# Patient Record
Sex: FEMALE | Race: WHITE | NOT HISPANIC OR LATINO | Employment: FULL TIME
[De-identification: names, ages, dates, MRNs, and addresses within clinical notes are randomized per-mention and may not be internally consistent; named-entity substitution may affect disease eponyms.]

---

## 2017-01-18 ENCOUNTER — RECORDS - HEALTHEAST (OUTPATIENT)
Dept: ADMINISTRATIVE | Facility: OTHER | Age: 52
End: 2017-01-18

## 2017-01-23 ENCOUNTER — AMBULATORY - HEALTHEAST (OUTPATIENT)
Dept: LAB | Facility: CLINIC | Age: 52
End: 2017-01-23

## 2017-01-23 DIAGNOSIS — R31.9 HEMATURIA: ICD-10-CM

## 2017-02-13 ENCOUNTER — AMBULATORY - HEALTHEAST (OUTPATIENT)
Dept: LAB | Facility: CLINIC | Age: 52
End: 2017-02-13

## 2017-02-13 DIAGNOSIS — R31.9 HEMATURIA: ICD-10-CM

## 2017-02-27 ENCOUNTER — COMMUNICATION - HEALTHEAST (OUTPATIENT)
Dept: FAMILY MEDICINE | Facility: CLINIC | Age: 52
End: 2017-02-27

## 2017-02-27 ENCOUNTER — MEDICAL CORRESPONDENCE (OUTPATIENT)
Dept: HEALTH INFORMATION MANAGEMENT | Facility: CLINIC | Age: 52
End: 2017-02-27

## 2017-03-13 ENCOUNTER — OFFICE VISIT - HEALTHEAST (OUTPATIENT)
Dept: FAMILY MEDICINE | Facility: CLINIC | Age: 52
End: 2017-03-13

## 2017-03-13 DIAGNOSIS — G24.5 SPASM EYELID: ICD-10-CM

## 2017-03-13 DIAGNOSIS — E05.00 GRAVES DISEASE: ICD-10-CM

## 2017-03-13 ASSESSMENT — MIFFLIN-ST. JEOR: SCORE: 1152.72

## 2017-05-01 ENCOUNTER — OFFICE VISIT - HEALTHEAST (OUTPATIENT)
Dept: FAMILY MEDICINE | Facility: CLINIC | Age: 52
End: 2017-05-01

## 2017-05-01 DIAGNOSIS — H81.10 BENIGN POSITIONAL VERTIGO: ICD-10-CM

## 2017-05-01 DIAGNOSIS — E78.00 HYPERCHOLESTEROLEMIA: ICD-10-CM

## 2017-05-01 ASSESSMENT — MIFFLIN-ST. JEOR: SCORE: 1141.95

## 2017-05-08 ENCOUNTER — RECORDS - HEALTHEAST (OUTPATIENT)
Dept: ADMINISTRATIVE | Facility: OTHER | Age: 52
End: 2017-05-08

## 2017-05-15 ENCOUNTER — AMBULATORY - HEALTHEAST (OUTPATIENT)
Dept: FAMILY MEDICINE | Facility: CLINIC | Age: 52
End: 2017-05-15

## 2017-05-15 ENCOUNTER — AMBULATORY - HEALTHEAST (OUTPATIENT)
Dept: LAB | Facility: CLINIC | Age: 52
End: 2017-05-15

## 2017-05-15 DIAGNOSIS — E05.00 GRAVES DISEASE: ICD-10-CM

## 2017-05-15 DIAGNOSIS — E78.00 HYPERCHOLESTEROLEMIA: ICD-10-CM

## 2017-05-15 DIAGNOSIS — G24.5 SPASM EYELID: ICD-10-CM

## 2017-05-15 DIAGNOSIS — E05.90 HYPERTHYROIDISM: ICD-10-CM

## 2017-05-15 LAB
CHOLEST SERPL-MCNC: 291 MG/DL
FASTING STATUS PATIENT QL REPORTED: YES
HDLC SERPL-MCNC: 82 MG/DL
LDLC SERPL CALC-MCNC: 193 MG/DL
TRIGL SERPL-MCNC: 82 MG/DL

## 2017-05-16 ENCOUNTER — COMMUNICATION - HEALTHEAST (OUTPATIENT)
Dept: FAMILY MEDICINE | Facility: CLINIC | Age: 52
End: 2017-05-16

## 2017-05-19 ENCOUNTER — COMMUNICATION - HEALTHEAST (OUTPATIENT)
Dept: FAMILY MEDICINE | Facility: CLINIC | Age: 52
End: 2017-05-19

## 2017-05-19 DIAGNOSIS — E05.00 GRAVES DISEASE: ICD-10-CM

## 2017-06-12 ENCOUNTER — AMBULATORY - HEALTHEAST (OUTPATIENT)
Dept: LAB | Facility: CLINIC | Age: 52
End: 2017-06-12

## 2017-06-12 DIAGNOSIS — E05.00 GRAVES DISEASE: ICD-10-CM

## 2017-06-17 ENCOUNTER — HEALTH MAINTENANCE LETTER (OUTPATIENT)
Age: 52
End: 2017-06-17

## 2017-06-27 ENCOUNTER — COMMUNICATION - HEALTHEAST (OUTPATIENT)
Dept: FAMILY MEDICINE | Facility: CLINIC | Age: 52
End: 2017-06-27

## 2017-08-15 ENCOUNTER — COMMUNICATION - HEALTHEAST (OUTPATIENT)
Dept: FAMILY MEDICINE | Facility: CLINIC | Age: 52
End: 2017-08-15

## 2017-08-15 DIAGNOSIS — E05.00 GRAVES DISEASE: ICD-10-CM

## 2017-08-21 ENCOUNTER — AMBULATORY - HEALTHEAST (OUTPATIENT)
Dept: LAB | Facility: CLINIC | Age: 52
End: 2017-08-21

## 2017-08-21 DIAGNOSIS — E05.00 GRAVES DISEASE: ICD-10-CM

## 2017-08-22 ENCOUNTER — COMMUNICATION - HEALTHEAST (OUTPATIENT)
Dept: FAMILY MEDICINE | Facility: CLINIC | Age: 52
End: 2017-08-22

## 2017-08-22 ENCOUNTER — OFFICE VISIT - HEALTHEAST (OUTPATIENT)
Dept: FAMILY MEDICINE | Facility: CLINIC | Age: 52
End: 2017-08-22

## 2017-08-22 DIAGNOSIS — M79.601 RIGHT ARM PAIN: ICD-10-CM

## 2017-08-22 DIAGNOSIS — R49.0 HOARSE VOICE QUALITY: ICD-10-CM

## 2017-08-22 DIAGNOSIS — E05.00 GRAVES DISEASE: ICD-10-CM

## 2017-08-22 ASSESSMENT — MIFFLIN-ST. JEOR: SCORE: 1126.79

## 2017-08-29 ENCOUNTER — COMMUNICATION - HEALTHEAST (OUTPATIENT)
Dept: FAMILY MEDICINE | Facility: CLINIC | Age: 52
End: 2017-08-29

## 2017-08-29 ENCOUNTER — OFFICE VISIT - HEALTHEAST (OUTPATIENT)
Dept: PHYSICAL THERAPY | Facility: REHABILITATION | Age: 52
End: 2017-08-29

## 2017-08-29 DIAGNOSIS — M25.611 DECREASED ROM OF RIGHT SHOULDER: ICD-10-CM

## 2017-08-29 DIAGNOSIS — G89.29 ELBOW PAIN, CHRONIC, RIGHT: ICD-10-CM

## 2017-08-29 DIAGNOSIS — M25.521 ELBOW PAIN, CHRONIC, RIGHT: ICD-10-CM

## 2017-08-29 DIAGNOSIS — M62.81 MUSCLE WEAKNESS OF RIGHT UPPER EXTREMITY: ICD-10-CM

## 2017-08-29 DIAGNOSIS — M25.511 CHRONIC RIGHT SHOULDER PAIN: ICD-10-CM

## 2017-08-29 DIAGNOSIS — M77.11 RIGHT LATERAL EPICONDYLITIS: ICD-10-CM

## 2017-08-29 DIAGNOSIS — M75.21 BICIPITAL TENDONITIS OF RIGHT SHOULDER: ICD-10-CM

## 2017-08-29 DIAGNOSIS — M75.81 RIGHT ROTATOR CUFF TENDONITIS: ICD-10-CM

## 2017-08-29 DIAGNOSIS — G89.29 CHRONIC RIGHT SHOULDER PAIN: ICD-10-CM

## 2017-09-05 ENCOUNTER — OFFICE VISIT - HEALTHEAST (OUTPATIENT)
Dept: PHYSICAL THERAPY | Facility: REHABILITATION | Age: 52
End: 2017-09-05

## 2017-09-05 DIAGNOSIS — G89.29 ELBOW PAIN, CHRONIC, RIGHT: ICD-10-CM

## 2017-09-05 DIAGNOSIS — M25.511 CHRONIC RIGHT SHOULDER PAIN: ICD-10-CM

## 2017-09-05 DIAGNOSIS — M62.81 MUSCLE WEAKNESS OF RIGHT UPPER EXTREMITY: ICD-10-CM

## 2017-09-05 DIAGNOSIS — M25.521 ELBOW PAIN, CHRONIC, RIGHT: ICD-10-CM

## 2017-09-05 DIAGNOSIS — M77.11 RIGHT LATERAL EPICONDYLITIS: ICD-10-CM

## 2017-09-05 DIAGNOSIS — G89.29 CHRONIC RIGHT SHOULDER PAIN: ICD-10-CM

## 2017-09-05 DIAGNOSIS — M25.611 DECREASED ROM OF RIGHT SHOULDER: ICD-10-CM

## 2017-09-05 DIAGNOSIS — M75.81 RIGHT ROTATOR CUFF TENDONITIS: ICD-10-CM

## 2017-09-05 DIAGNOSIS — M75.21 BICIPITAL TENDONITIS OF RIGHT SHOULDER: ICD-10-CM

## 2017-09-21 ENCOUNTER — OFFICE VISIT - HEALTHEAST (OUTPATIENT)
Dept: PHYSICAL THERAPY | Facility: REHABILITATION | Age: 52
End: 2017-09-21

## 2017-09-21 DIAGNOSIS — M75.81 RIGHT ROTATOR CUFF TENDONITIS: ICD-10-CM

## 2017-09-21 DIAGNOSIS — G89.29 CHRONIC RIGHT SHOULDER PAIN: ICD-10-CM

## 2017-09-21 DIAGNOSIS — M77.11 RIGHT LATERAL EPICONDYLITIS: ICD-10-CM

## 2017-09-21 DIAGNOSIS — M75.21 BICIPITAL TENDONITIS OF RIGHT SHOULDER: ICD-10-CM

## 2017-09-21 DIAGNOSIS — G89.29 ELBOW PAIN, CHRONIC, RIGHT: ICD-10-CM

## 2017-09-21 DIAGNOSIS — M25.511 CHRONIC RIGHT SHOULDER PAIN: ICD-10-CM

## 2017-09-21 DIAGNOSIS — M62.81 MUSCLE WEAKNESS OF RIGHT UPPER EXTREMITY: ICD-10-CM

## 2017-09-21 DIAGNOSIS — M25.521 ELBOW PAIN, CHRONIC, RIGHT: ICD-10-CM

## 2017-09-21 DIAGNOSIS — M25.611 DECREASED ROM OF RIGHT SHOULDER: ICD-10-CM

## 2017-10-05 ENCOUNTER — OFFICE VISIT - HEALTHEAST (OUTPATIENT)
Dept: PHYSICAL THERAPY | Facility: REHABILITATION | Age: 52
End: 2017-10-05

## 2017-10-05 ENCOUNTER — COMMUNICATION - HEALTHEAST (OUTPATIENT)
Dept: PHYSICAL THERAPY | Facility: REHABILITATION | Age: 52
End: 2017-10-05

## 2017-10-05 DIAGNOSIS — M25.521 ELBOW PAIN, CHRONIC, RIGHT: ICD-10-CM

## 2017-10-05 DIAGNOSIS — M25.511 CHRONIC RIGHT SHOULDER PAIN: ICD-10-CM

## 2017-10-05 DIAGNOSIS — M62.81 MUSCLE WEAKNESS OF RIGHT UPPER EXTREMITY: ICD-10-CM

## 2017-10-05 DIAGNOSIS — M75.21 BICIPITAL TENDONITIS OF RIGHT SHOULDER: ICD-10-CM

## 2017-10-05 DIAGNOSIS — M25.611 DECREASED ROM OF RIGHT SHOULDER: ICD-10-CM

## 2017-10-05 DIAGNOSIS — M75.81 RIGHT ROTATOR CUFF TENDONITIS: ICD-10-CM

## 2017-10-05 DIAGNOSIS — G89.29 ELBOW PAIN, CHRONIC, RIGHT: ICD-10-CM

## 2017-10-05 DIAGNOSIS — M77.11 RIGHT LATERAL EPICONDYLITIS: ICD-10-CM

## 2017-10-05 DIAGNOSIS — G89.29 CHRONIC RIGHT SHOULDER PAIN: ICD-10-CM

## 2017-10-10 ENCOUNTER — OFFICE VISIT - HEALTHEAST (OUTPATIENT)
Dept: PHYSICAL THERAPY | Facility: REHABILITATION | Age: 52
End: 2017-10-10

## 2017-10-10 DIAGNOSIS — M75.21 BICIPITAL TENDONITIS OF RIGHT SHOULDER: ICD-10-CM

## 2017-10-10 DIAGNOSIS — M25.511 CHRONIC RIGHT SHOULDER PAIN: ICD-10-CM

## 2017-10-10 DIAGNOSIS — M62.81 MUSCLE WEAKNESS OF RIGHT UPPER EXTREMITY: ICD-10-CM

## 2017-10-10 DIAGNOSIS — G89.29 CHRONIC RIGHT SHOULDER PAIN: ICD-10-CM

## 2017-10-10 DIAGNOSIS — G89.29 ELBOW PAIN, CHRONIC, RIGHT: ICD-10-CM

## 2017-10-10 DIAGNOSIS — M77.11 RIGHT LATERAL EPICONDYLITIS: ICD-10-CM

## 2017-10-10 DIAGNOSIS — M75.81 RIGHT ROTATOR CUFF TENDONITIS: ICD-10-CM

## 2017-10-10 DIAGNOSIS — M25.521 ELBOW PAIN, CHRONIC, RIGHT: ICD-10-CM

## 2017-10-10 DIAGNOSIS — M25.611 DECREASED ROM OF RIGHT SHOULDER: ICD-10-CM

## 2017-10-12 ENCOUNTER — OFFICE VISIT - HEALTHEAST (OUTPATIENT)
Dept: PHYSICAL THERAPY | Facility: REHABILITATION | Age: 52
End: 2017-10-12

## 2017-10-12 DIAGNOSIS — M25.521 ELBOW PAIN, CHRONIC, RIGHT: ICD-10-CM

## 2017-10-12 DIAGNOSIS — M77.11 RIGHT LATERAL EPICONDYLITIS: ICD-10-CM

## 2017-10-12 DIAGNOSIS — G89.29 CHRONIC RIGHT SHOULDER PAIN: ICD-10-CM

## 2017-10-12 DIAGNOSIS — M75.21 BICIPITAL TENDONITIS OF RIGHT SHOULDER: ICD-10-CM

## 2017-10-12 DIAGNOSIS — M25.611 DECREASED ROM OF RIGHT SHOULDER: ICD-10-CM

## 2017-10-12 DIAGNOSIS — M75.81 RIGHT ROTATOR CUFF TENDONITIS: ICD-10-CM

## 2017-10-12 DIAGNOSIS — M62.81 MUSCLE WEAKNESS OF RIGHT UPPER EXTREMITY: ICD-10-CM

## 2017-10-12 DIAGNOSIS — M25.511 CHRONIC RIGHT SHOULDER PAIN: ICD-10-CM

## 2017-10-12 DIAGNOSIS — G89.29 ELBOW PAIN, CHRONIC, RIGHT: ICD-10-CM

## 2017-10-16 ENCOUNTER — OFFICE VISIT - HEALTHEAST (OUTPATIENT)
Dept: PHYSICAL THERAPY | Facility: REHABILITATION | Age: 52
End: 2017-10-16

## 2017-10-16 DIAGNOSIS — M25.521 ELBOW PAIN, CHRONIC, RIGHT: ICD-10-CM

## 2017-10-16 DIAGNOSIS — M25.611 DECREASED ROM OF RIGHT SHOULDER: ICD-10-CM

## 2017-10-16 DIAGNOSIS — G89.29 CHRONIC RIGHT SHOULDER PAIN: ICD-10-CM

## 2017-10-16 DIAGNOSIS — M25.511 CHRONIC RIGHT SHOULDER PAIN: ICD-10-CM

## 2017-10-16 DIAGNOSIS — G89.29 ELBOW PAIN, CHRONIC, RIGHT: ICD-10-CM

## 2017-10-16 DIAGNOSIS — M62.81 MUSCLE WEAKNESS OF RIGHT UPPER EXTREMITY: ICD-10-CM

## 2017-10-16 DIAGNOSIS — M75.21 BICIPITAL TENDONITIS OF RIGHT SHOULDER: ICD-10-CM

## 2017-10-16 DIAGNOSIS — M77.11 RIGHT LATERAL EPICONDYLITIS: ICD-10-CM

## 2017-10-16 DIAGNOSIS — M75.81 RIGHT ROTATOR CUFF TENDONITIS: ICD-10-CM

## 2017-10-18 ENCOUNTER — OFFICE VISIT - HEALTHEAST (OUTPATIENT)
Dept: PHYSICAL THERAPY | Facility: REHABILITATION | Age: 52
End: 2017-10-18

## 2017-10-18 DIAGNOSIS — M25.511 CHRONIC RIGHT SHOULDER PAIN: ICD-10-CM

## 2017-10-18 DIAGNOSIS — M75.81 RIGHT ROTATOR CUFF TENDONITIS: ICD-10-CM

## 2017-10-18 DIAGNOSIS — G89.29 ELBOW PAIN, CHRONIC, RIGHT: ICD-10-CM

## 2017-10-18 DIAGNOSIS — M25.611 DECREASED ROM OF RIGHT SHOULDER: ICD-10-CM

## 2017-10-18 DIAGNOSIS — M77.11 RIGHT LATERAL EPICONDYLITIS: ICD-10-CM

## 2017-10-18 DIAGNOSIS — G89.29 CHRONIC RIGHT SHOULDER PAIN: ICD-10-CM

## 2017-10-18 DIAGNOSIS — M62.81 MUSCLE WEAKNESS OF RIGHT UPPER EXTREMITY: ICD-10-CM

## 2017-10-18 DIAGNOSIS — M25.521 ELBOW PAIN, CHRONIC, RIGHT: ICD-10-CM

## 2017-10-18 DIAGNOSIS — M75.21 BICIPITAL TENDONITIS OF RIGHT SHOULDER: ICD-10-CM

## 2017-10-24 ENCOUNTER — OFFICE VISIT - HEALTHEAST (OUTPATIENT)
Dept: PHYSICAL THERAPY | Facility: REHABILITATION | Age: 52
End: 2017-10-24

## 2017-10-24 DIAGNOSIS — M25.611 DECREASED ROM OF RIGHT SHOULDER: ICD-10-CM

## 2017-10-24 DIAGNOSIS — M25.511 CHRONIC RIGHT SHOULDER PAIN: ICD-10-CM

## 2017-10-24 DIAGNOSIS — M25.521 ELBOW PAIN, CHRONIC, RIGHT: ICD-10-CM

## 2017-10-24 DIAGNOSIS — M75.21 BICIPITAL TENDONITIS OF RIGHT SHOULDER: ICD-10-CM

## 2017-10-24 DIAGNOSIS — M75.81 RIGHT ROTATOR CUFF TENDONITIS: ICD-10-CM

## 2017-10-24 DIAGNOSIS — G89.29 ELBOW PAIN, CHRONIC, RIGHT: ICD-10-CM

## 2017-10-24 DIAGNOSIS — M62.81 MUSCLE WEAKNESS OF RIGHT UPPER EXTREMITY: ICD-10-CM

## 2017-10-24 DIAGNOSIS — M77.11 RIGHT LATERAL EPICONDYLITIS: ICD-10-CM

## 2017-10-24 DIAGNOSIS — G89.29 CHRONIC RIGHT SHOULDER PAIN: ICD-10-CM

## 2017-10-26 ENCOUNTER — OFFICE VISIT - HEALTHEAST (OUTPATIENT)
Dept: PHYSICAL THERAPY | Facility: REHABILITATION | Age: 52
End: 2017-10-26

## 2017-10-26 DIAGNOSIS — M25.511 CHRONIC RIGHT SHOULDER PAIN: ICD-10-CM

## 2017-10-26 DIAGNOSIS — M75.21 BICIPITAL TENDONITIS OF RIGHT SHOULDER: ICD-10-CM

## 2017-10-26 DIAGNOSIS — M77.11 RIGHT LATERAL EPICONDYLITIS: ICD-10-CM

## 2017-10-26 DIAGNOSIS — G89.29 ELBOW PAIN, CHRONIC, RIGHT: ICD-10-CM

## 2017-10-26 DIAGNOSIS — M75.81 RIGHT ROTATOR CUFF TENDONITIS: ICD-10-CM

## 2017-10-26 DIAGNOSIS — M25.611 DECREASED ROM OF RIGHT SHOULDER: ICD-10-CM

## 2017-10-26 DIAGNOSIS — G89.29 CHRONIC RIGHT SHOULDER PAIN: ICD-10-CM

## 2017-10-26 DIAGNOSIS — M25.521 ELBOW PAIN, CHRONIC, RIGHT: ICD-10-CM

## 2017-10-26 DIAGNOSIS — M62.81 MUSCLE WEAKNESS OF RIGHT UPPER EXTREMITY: ICD-10-CM

## 2017-11-05 ENCOUNTER — OFFICE VISIT (OUTPATIENT)
Dept: URGENT CARE | Facility: URGENT CARE | Age: 52
End: 2017-11-05
Payer: COMMERCIAL

## 2017-11-05 VITALS
WEIGHT: 129 LBS | BODY MASS INDEX: 23.74 KG/M2 | TEMPERATURE: 98.2 F | SYSTOLIC BLOOD PRESSURE: 102 MMHG | DIASTOLIC BLOOD PRESSURE: 73 MMHG | HEIGHT: 62 IN | HEART RATE: 69 BPM | OXYGEN SATURATION: 99 %

## 2017-11-05 DIAGNOSIS — J40 BRONCHITIS: Primary | ICD-10-CM

## 2017-11-05 PROCEDURE — 99213 OFFICE O/P EST LOW 20 MIN: CPT | Performed by: FAMILY MEDICINE

## 2017-11-05 RX ORDER — AZITHROMYCIN 250 MG/1
TABLET, FILM COATED ORAL
Qty: 6 TABLET | Refills: 0 | Status: SHIPPED | OUTPATIENT
Start: 2017-11-05 | End: 2021-06-08

## 2017-11-05 NOTE — NURSING NOTE
"Chief Complaint   Patient presents with     Urgent Care     Pt in clinic to have eval for fever lasting 5 days and cough.     Fever     Cough       Initial /73  Pulse 69  Temp 98.2  F (36.8  C) (Tympanic)  Ht 5' 2\" (1.575 m)  Wt 129 lb (58.5 kg)  LMP 08/21/2013  SpO2 99%  BMI 23.59 kg/m2 Estimated body mass index is 23.59 kg/(m^2) as calculated from the following:    Height as of this encounter: 5' 2\" (1.575 m).    Weight as of this encounter: 129 lb (58.5 kg).  Medication Reconciliation: complete   Elza Rodas/ MA    "

## 2017-11-05 NOTE — MR AVS SNAPSHOT
After Visit Summary   11/5/2017    Kasia Macedo    MRN: [id number]           Patient Information     Date Of Birth          1965        Visit Information        Provider Department      11/5/2017 10:15 AM Joshua Melo MD [facility]        Today's Diagnoses     Bronchitis    -  1      Care Instructions      Bronchitis, Antibiotic Treatment (Adult)    Bronchitis is an infection of the air passages (bronchial tubes) in your lungs. It often occurs when you have a cold. This illness is contagious during the first few days and is spread through the air by coughing and sneezing, or by direct contact (touching the sick person and then touching your own eyes, nose, or mouth).  Symptoms of bronchitis include cough with mucus (phlegm) and low-grade fever. Bronchitis usually lasts 7 to 14 days. Mild cases can be treated with simple home remedies. More severe infection is treated with an antibiotic.  Home care  Follow these guidelines when caring for yourself at home:    If your symptoms are severe, rest at home for the first 2 to 3 days. When you go back to your usual activities, don't let yourself get too tired.    Do not smoke. Also avoid being exposed to secondhand smoke.    You may use over-the-counter medicines to control fever or pain, unless another medicine was prescribed. (Note: If you have chronic liver or kidney disease or have ever had a stomach ulcer or gastrointestinal bleeding, talk with your healthcare provider before using these medicines. Also talk to your provider if you are taking medicine to prevent blood clots.) Aspirin should never be given to anyone younger than 18 years of age who is ill with a viral infection or fever. It may cause severe liver or brain damage.    Your appetite may be poor, so a light diet is fine. Avoid dehydration by drinking 6 to 8 glasses of fluids per day (such as water, soft drinks, sports drinks, juices, tea, or soup). Extra  fluids will help loosen secretions in the nose and lungs.    Over-the-counter cough, cold, and sore-throat medicines will not shorten the length of the illness, but they may be helpful to reduce symptoms. (Note: Do not use decongestants if you have high blood pressure.)    Finish all antibiotic medicine. Do this even if you are feeling better after only a few days.  Follow-up care  Follow up with your healthcare provider, or as advised. If you had an X-ray or ECG (electrocardiogram), a specialist will review it. You will be notified of any new findings that may affect your care.  Note: If you are age 65 or older, or if you have a chronic lung disease or condition that affects your immune system, or you smoke, talk to your healthcare provider about having pneumococcal vaccinations and a yearly influenza vaccination (flu shot).  When to seek medical advice  Call your healthcare provider right away if any of these occur:    Fever of 100.4 F (38 C) or higher    Coughing up increased amounts of colored sputum    Weakness, drowsiness, headache, facial pain, ear pain, or a stiff neck  Call 911, or get immediate medical care  Contact emergency services right away if any of these occur.    Coughing up blood    Worsening weakness, drowsiness, headache, or stiff neck    Trouble breathing, wheezing, or pain with breathing  Date Last Reviewed: 9/13/2015 2000-2017 [employer]. [address] [place] [address]. All rights reserved. This information is not intended as a substitute for professional medical care. Always follow your healthcare professional's instructions.                Follow-ups after your visit        Who to contact     If you have questions or need follow up information about today's clinic visit or your schedule please contact [facility] directly at [phone].  Normal or non-critical lab and imaging results will be communicated to you by MyChart, letter or  "phone within 4 business days after the clinic has received the results. If you do not hear from us within 7 days, please contact the clinic through NETpeas or phone. If you have a critical or abnormal lab result, we will notify you by phone as soon as possible.  Submit refill requests through [employer] or call your pharmacy and they will forward the refill request to us. Please allow 3 business days for your refill to be completed.          Additional Information About Your Visit        NETpeas Information     [employer] gives you secure access to your electronic health record. If you see a primary care provider, you can also send messages to your care team and make appointments. If you have questions, please call your primary care clinic.  If you do not have a primary care provider, please call [phone] and they will assist you.        Care EveryWhere ID     This is your Care EveryWhere ID. This could be used by other organizations to access your [place] medical records  [id number]        Your Vitals Were     Pulse Temperature Height Last Period Pulse Oximetry BMI (Body Mass Index)    69 98.2  F (36.8  C) (Tympanic) 5' 2\" (1.575 m) 08/21/2013 99% 23.59 kg/m2       Blood Pressure from Last 3 Encounters:   11/05/17 102/73   12/22/14 (!) 148/114   08/28/13 112/62    Weight from Last 3 Encounters:   11/05/17 129 lb (58.5 kg)   12/22/14 134 lb 6.4 oz (61 kg)   08/28/13 136 lb (61.7 kg)              Today, you had the following     No orders found for display         Today's Medication Changes          These changes are accurate as of: 11/5/17 10:39 AM.  If you have any questions, ask your nurse or doctor.               Start taking these medicines.        Dose/Directions    azithromycin 250 MG tablet   Commonly known as:  ZITHROMAX   Used for:  Bronchitis   Started by:  Joshua Melo MD        Two tablets first day, then one tablet daily for four days.   Quantity:  6 tablet   Refills:  0            Where to get your " medicines      These medications were sent to [facility] [id number] [facility] [address] [place] [id number] [facility] [id number]     Phone:  [phone]     azithromycin 250 MG tablet                Primary Care Provider    None Specified       No primary provider on file.        Equal Access to Services     SAMARA STEARNS : Hadii aad ku hadasho Soomaali, waaxda luqadaha, qaybta kaalmada ademichaelyada, graham pacheco. So [facility] [phone].    ATENCIÓN: Si habla español, tiene a leal disposición servicios gratuitos de asistencia lingüística. Fadi al [phone].    We comply with applicable federal civil rights laws and [place] laws. We do not discriminate on the basis of race, color, national origin, age, disability, sex, sexual orientation, or gender identity.            Thank you!     Thank you for choosing [facility]  for your care. Our goal is always to provide you with excellent care. Hearing back from our patients is one way we can continue to improve our services. Please take a few minutes to complete the written survey that you may receive in the mail after your visit with us. Thank you!             Your Updated Medication List - Protect others around you: Learn how to safely use, store and throw away your medicines at www.disposemymeds.org.          This list is accurate as of: 11/5/17 10:39 AM.  Always use your most recent med list.                   Brand Name Dispense Instructions for use Diagnosis    ACIDOPHILUS PO      Take  by mouth. 2 tablets daily        azithromycin 250 MG tablet    ZITHROMAX    6 tablet    Two tablets first day, then one tablet daily for four days.    Bronchitis       FISH OIL PO      Take  by mouth. 2 capsules daily        IBUPROFEN PO

## 2017-11-05 NOTE — PATIENT INSTRUCTIONS
Bronchitis, Antibiotic Treatment (Adult)    Bronchitis is an infection of the air passages (bronchial tubes) in your lungs. It often occurs when you have a cold. This illness is contagious during the first few days and is spread through the air by coughing and sneezing, or by direct contact (touching the sick person and then touching your own eyes, nose, or mouth).  Symptoms of bronchitis include cough with mucus (phlegm) and low-grade fever. Bronchitis usually lasts 7 to 14 days. Mild cases can be treated with simple home remedies. More severe infection is treated with an antibiotic.  Home care  Follow these guidelines when caring for yourself at home:    If your symptoms are severe, rest at home for the first 2 to 3 days. When you go back to your usual activities, don't let yourself get too tired.    Do not smoke. Also avoid being exposed to secondhand smoke.    You may use over-the-counter medicines to control fever or pain, unless another medicine was prescribed. (Note: If you have chronic liver or kidney disease or have ever had a stomach ulcer or gastrointestinal bleeding, talk with your healthcare provider before using these medicines. Also talk to your provider if you are taking medicine to prevent blood clots.) Aspirin should never be given to anyone younger than 18 years of age who is ill with a viral infection or fever. It may cause severe liver or brain damage.    Your appetite may be poor, so a light diet is fine. Avoid dehydration by drinking 6 to 8 glasses of fluids per day (such as water, soft drinks, sports drinks, juices, tea, or soup). Extra fluids will help loosen secretions in the nose and lungs.    Over-the-counter cough, cold, and sore-throat medicines will not shorten the length of the illness, but they may be helpful to reduce symptoms. (Note: Do not use decongestants if you have high blood pressure.)    Finish all antibiotic medicine. Do this even if you are feeling better after only a  few days.  Follow-up care  Follow up with your healthcare provider, or as advised. If you had an X-ray or ECG (electrocardiogram), a specialist will review it. You will be notified of any new findings that may affect your care.  Note: If you are age 65 or older, or if you have a chronic lung disease or condition that affects your immune system, or you smoke, talk to your healthcare provider about having pneumococcal vaccinations and a yearly influenza vaccination (flu shot).  When to seek medical advice  Call your healthcare provider right away if any of these occur:    Fever of 100.4 F (38 C) or higher    Coughing up increased amounts of colored sputum    Weakness, drowsiness, headache, facial pain, ear pain, or a stiff neck  Call 911, or get immediate medical care  Contact emergency services right away if any of these occur.    Coughing up blood    Worsening weakness, drowsiness, headache, or stiff neck    Trouble breathing, wheezing, or pain with breathing  Date Last Reviewed: 9/13/2015 2000-2017 [employer]. [address] [place] [address], [place] [address]. All rights reserved. This information is not intended as a substitute for professional medical care. Always follow your healthcare professional's instructions.

## 2017-11-08 NOTE — PROGRESS NOTES
"SUBJECTIVE:  Kasia Macedo, a 52 year old female scheduled an appointment to discuss the following issues:  Bronchitis    Medical, social, surgical, and family histories reviewed.    Urgent Care  Pt in clinic to have eval for fever lasting 5 days and cough.    Fever      Cough       Has some GI upset and burping, feeling weak and tired.      ROS:  See HPI.  Mild nausea but no vomiting.  Low grade fever/chills.  No chest pain/SOB.  No BM/urine problems.  No dizziness or syncope.      OBJECTIVE:  /73  Pulse 69  Temp 98.2  F (36.8  C) (Tympanic)  Ht 5' 2\" (1.575 m)  Wt 129 lb (58.5 kg)  LMP 08/21/2013  SpO2 99%  BMI 23.59 kg/m2  EXAM:  GENERAL APPEARANCE: alert and mild distress; afebrile; no cyanosis or accessory muscle use; moist mucus membrane  EYES: Eyes grossly normal to inspection, PERRL and conjunctivae and sclerae normal  HENT: ear canals and TM's normal and nose and mouth without ulcers or lesions  NECK: no adenopathy, no asymmetry, masses, or scars and thyroid normal to palpation  RESP: mild scattered rhonchi, no crackles or wheezes; good air entry   CV: regular rates and rhythm, normal S1 S2, no S3 or S4 and no murmur, click or rub  LYMPHATICS: normal ant/post cervical and supraclavicular nodes  ABDOMEN: soft, nontender, without hepatosplenomegaly or masses and bowel sounds normal  MS: extremities normal- no gross deformities noted  SKIN: no suspicious lesions or rashes  NEURO: Normal strength and tone, mentation intact and speech normal    ASSESSMENT/PLAN:  (J40) Bronchitis  (primary encounter diagnosis)  Plan: azithromycin (ZITHROMAX) 250 MG tablet       Fluid, rest.  Pt to f/up PCP if no improvement or worsening.  Warning signs and symptoms explained.        "

## 2018-02-27 ENCOUNTER — OFFICE VISIT - HEALTHEAST (OUTPATIENT)
Dept: FAMILY MEDICINE | Facility: CLINIC | Age: 53
End: 2018-02-27

## 2018-02-27 ENCOUNTER — MEDICAL CORRESPONDENCE (OUTPATIENT)
Dept: HEALTH INFORMATION MANAGEMENT | Facility: CLINIC | Age: 53
End: 2018-02-27

## 2018-02-27 ENCOUNTER — COMMUNICATION - HEALTHEAST (OUTPATIENT)
Dept: FAMILY MEDICINE | Facility: CLINIC | Age: 53
End: 2018-02-27

## 2018-02-27 DIAGNOSIS — N64.4 BREAST PAIN, LEFT: ICD-10-CM

## 2018-02-28 ENCOUNTER — RADIANT APPOINTMENT (OUTPATIENT)
Dept: MAMMOGRAPHY | Facility: CLINIC | Age: 53
End: 2018-02-28

## 2018-02-28 DIAGNOSIS — N64.4 BREAST PAIN, LEFT: ICD-10-CM

## 2018-05-14 ENCOUNTER — THERAPY VISIT (OUTPATIENT)
Dept: PHYSICAL THERAPY | Facility: CLINIC | Age: 53
End: 2018-05-14
Payer: COMMERCIAL

## 2018-05-14 DIAGNOSIS — M25.512 LEFT SHOULDER PAIN: Primary | ICD-10-CM

## 2018-05-14 PROCEDURE — 97161 PT EVAL LOW COMPLEX 20 MIN: CPT | Mod: GP | Performed by: PHYSICAL THERAPIST

## 2018-05-14 PROCEDURE — 97112 NEUROMUSCULAR REEDUCATION: CPT | Mod: GP | Performed by: PHYSICAL THERAPIST

## 2018-05-14 NOTE — PROGRESS NOTES
[place] [facility] Initial Evaluation  Subjective:  Patient is a 53 year old female presenting with rehab left shoulder hpi. The history is provided by the patient. No  was used.   Kasia Macedo is a 53 year old female with a left shoulder condition.  Condition occurred with:  A fall (She had flu and walked into kitchen and fainted. ).  Condition occurred: at home.  This is a new condition  November 1, 2017 ( original injury) reinjured February 2018.    Site of Pain: posterior shoulder just above arm pit   Radiates to: none.  Pain is described as sharp and is intermittent and reported as 5/10.  Associated with: loss of motion and strength. Worse during: none.  Symptoms are exacerbated by using arm behind back and relieved by ice.  Since onset symptoms are gradually improving.  Special testing: none.  Previous treatment includes chiropractic.  There was moderate improvement following previous treatment.  General health as reported by patient is good.                                              Objective:  System                   Shoulder Evaluation:  ROM:  AROM:    Flexion:  Left:  170 with end range tightness    Right:  WNL  Extension: Left: WNLRight: WNL  Abduction:  Left:  wtih end range tightness   Right:  WNl    Internal Rotation:  Left:  55 with pain and tightness    Right:  WNL  External Rotation:  Left:  70 with tightness    Right:  WNL      Elbow Flexion:  Left:  WNL    Right:  WNL  Elbow Extension:  Left:  WNl   Right:  WNl              Strength:    Flexion: Left:4+/5   Pain:    Right: 5/5     Pain:   Extension:  Left: 4+/5    Pain:    Right: 5/5    Pain:  Abduction:  Left: 4+/5  Pain:    Right: 5/5     Pain:    Internal Rotation:  Left:4/5      Pain:+    Right: 5/5     Pain:  External Rotation:   Left:4/5     Pain:   Right:4+/5     Pain:        Elbow Flexion:  Left:5/5     Pain:    Right:5/5     Pain:    Stability Testing:  not assessed      Special Tests:       Left shoulder negative for the following special tests:  Bursal      Mobility Tests:      Glenohumeral posterior left:  Normal  Glenohumeral posterior right:  Normal    Acromioclavicular left:  Normal  Acromioclavicular right:  Normal      Sternoclavicular left:  Normal  Sternoclavicular right:  Normal  Scapulohumeral rhythm right:  Hypermobile  Scapulohumeral rhythm right:  Normal                                 protracted left greater than right scapula, delayed firing of left scapular retractors with scapular retraction, moderate tightness left posterior rotator cuff, levator scapula and upper trapezius muscles, neck screen unremarkable   General     ROS    Assessment/Plan:    Patient is a 53 year old female with left side shoulder complaints.    Patient has the following significant findings with corresponding treatment plan.                Diagnosis 1:  Left shoulder pain  Pain -  hot/cold therapy, manual therapy, self management, education, directional preference exercise and home program  Decreased ROM/flexibility - manual therapy, therapeutic exercise, therapeutic activity and home program  Decreased strength - therapeutic exercise, therapeutic activities and home program  Impaired muscle performance - neuro re-education and home program  Decreased function - therapeutic activities and home program    Therapy Evaluation Codes:   1) History comprised of:   Personal factors that impact the plan of care:      None.      Comorbidity factors that impact the plan of care are:      None.     Medications impacting care: None.  2) Examination of Body Systems comprised of:   Body structures and functions that impact the plan of care:      Shoulder.   Activity limitations that impact the plan of care are:      Sleeping.  3) Clinical presentation characteristics are:   Stable/Uncomplicated.  4) Decision-Making    Low complexity using standardized patient assessment instrument and/or measureable assessment of  functional outcome.  Cumulative Therapy Evaluation is: Low complexity.    Previous and current functional limitations:  (See Goal Flow Sheet for this information)    Short term and Long term goals: (See Goal Flow Sheet for this information)     Communication ability:  Patient appears to be able to clearly communicate and understand verbal and written communication and follow directions correctly.  Treatment Explanation - The following has been discussed with the patient:   RX ordered/plan of care  Anticipated outcomes  Possible risks and side effects  This patient would benefit from PT intervention to resume normal activities.   Rehab potential is excellent.    Frequency:  1 X week, once daily  Duration:  for 6 weeks  Discharge Plan:  Achieve all LTG.  Independent in home treatment program.  Reach maximal therapeutic benefit.    Please refer to the daily flowsheet for treatment today, total treatment time and time spent performing 1:1 timed codes.

## 2018-05-14 NOTE — MR AVS SNAPSHOT
After Visit Summary   5/14/2018    Kasia Macedo    MRN: [id number]           Patient Information     Date Of Birth          1965        Visit Information        Provider Department      5/14/2018 12:20 PM Iliana Delvalle PT [facility]        Today's Diagnoses     Left shoulder pain    -  1       Follow-ups after your visit        Your next 10 appointments already scheduled     May 21, 2018  1:40 PM CDT   ROM Extremity [facility]   [facility]    [address] [place] [id number] [phone]            May 29, 2018  8:40 AM CDT   ROM Extremity with Iliana Delvalle PT   [facility] [employer] [facility] ([facility])    [address] [place] [id number] [phone]              Who to contact     If you have questions or need follow up information about today's clinic visit or your schedule please contact [facility] directly at [phone].  Normal or non-critical lab and imaging results will be communicated to you by [employer], letter or phone within 4 business days after the clinic has received the results. If you do not hear from us within 7 days, please contact the clinic through [employer] or phone. If you have a critical or abnormal lab result, we will notify you by phone as soon as possible.  Submit refill requests through [employer]) or call your pharmacy and they will forward the refill request to us. Please allow 3 business days for your refill to be completed.          Additional Information About Your Visit        [employer] Information     [employer] gives you secure access to your electronic health record. If you see a primary care provider, you can also send messages to your care team and make appointments. If you have questions, please call your primary care clinic.  If you do not have  a primary care provider, please call [phone] and they will assist you.        Care EveryWhere ID     This is your Care EveryWhere ID. This could be used by other organizations to access your [place] medical records  [id number]        Your Vitals Were     Last Period                   08/21/2013            Blood Pressure from Last 3 Encounters:   11/05/17 102/73   12/22/14 (!) 148/114   08/28/13 112/62    Weight from Last 3 Encounters:   11/05/17 58.5 kg (129 lb)   12/22/14 61 kg (134 lb 6.4 oz)   08/28/13 61.7 kg (136 lb)              We Performed the Following     ROM Inital Eval Report     Neuromuscular Re-Education     PT Jasmine, Low Complexity ([id number])        Primary Care Provider Office Phone # Fax #    Gisella Durand [phone]       [place] [address]        Equal Access to Services     SAMARA STEARNS : Hadii aad ku hadasho Soomaali, waaxda luqadaha, qaybta kaalmada adeegyada, waxay lowin hayasadn bryce gold . So [facility] [phone].    ATENCIÓN: Si ayah blanco, tiene a leal disposición servicios gratuitos de asistencia lingüística. Llame al [phone].    We comply with applicable federal civil rights laws and [place] laws. We do not discriminate on the basis of race, color, national origin, age, disability, sex, sexual orientation, or gender identity.            Thank you!     Thank you for choosing [place] [facility]  for your care. Our goal is always to provide you with excellent care. Hearing back from our patients is one way we can continue to improve our services. Please take a few minutes to complete the written survey that you may receive in the mail after your visit with us. Thank you!             Your Updated Medication List - Protect others around you: Learn how to safely use, store and throw away your medicines at www.disposemymeds.org.          This list is accurate as of 5/14/18  2:07 PM.   Always use your most recent med list.                   Brand Name Dispense Instructions for use Diagnosis    ACIDOPHILUS PO      Take  by mouth. 2 tablets daily        azithromycin 250 MG tablet    ZITHROMAX    6 tablet    Two tablets first day, then one tablet daily for four days.    Bronchitis       FISH OIL PO      Take  by mouth. 2 capsules daily        IBUPROFEN PO

## 2018-05-15 NOTE — PROGRESS NOTES
[place] [facility] Initial Evaluation  Subjective:  Patient is a 53 year old female presenting with rehab left shoulder hpi.                    and reported as 5/10.                General health as reported by patient is good.  Pertinent medical history includes:  Thyroid problems.        Current occupation is Mental Health Therapist.    Primary job tasks include:  Prolonged standing (computer work).                                Objective:  System    Physical Exam    General     ROS    Assessment/Plan:

## 2018-05-21 ENCOUNTER — THERAPY VISIT (OUTPATIENT)
Dept: PHYSICAL THERAPY | Facility: CLINIC | Age: 53
End: 2018-05-21
Payer: COMMERCIAL

## 2018-05-21 DIAGNOSIS — M25.512 LEFT SHOULDER PAIN: Primary | ICD-10-CM

## 2018-05-21 PROCEDURE — 97112 NEUROMUSCULAR REEDUCATION: CPT | Mod: GP | Performed by: PHYSICAL THERAPIST

## 2018-05-21 PROCEDURE — 97140 MANUAL THERAPY 1/> REGIONS: CPT | Mod: GP | Performed by: PHYSICAL THERAPIST

## 2018-06-04 ENCOUNTER — THERAPY VISIT (OUTPATIENT)
Dept: PHYSICAL THERAPY | Facility: CLINIC | Age: 53
End: 2018-06-04
Payer: COMMERCIAL

## 2018-06-04 DIAGNOSIS — M25.512 LEFT SHOULDER PAIN: ICD-10-CM

## 2018-06-04 PROCEDURE — 97140 MANUAL THERAPY 1/> REGIONS: CPT | Mod: GP | Performed by: PHYSICAL THERAPIST

## 2018-06-04 PROCEDURE — 97112 NEUROMUSCULAR REEDUCATION: CPT | Mod: GP | Performed by: PHYSICAL THERAPIST

## 2018-06-18 ENCOUNTER — THERAPY VISIT (OUTPATIENT)
Dept: PHYSICAL THERAPY | Facility: CLINIC | Age: 53
End: 2018-06-18
Payer: COMMERCIAL

## 2018-06-18 DIAGNOSIS — M25.512 LEFT SHOULDER PAIN: Primary | ICD-10-CM

## 2018-06-18 PROCEDURE — 97140 MANUAL THERAPY 1/> REGIONS: CPT | Mod: GP | Performed by: PHYSICAL THERAPIST

## 2018-06-18 PROCEDURE — 97112 NEUROMUSCULAR REEDUCATION: CPT | Mod: GP | Performed by: PHYSICAL THERAPIST

## 2018-07-02 ENCOUNTER — THERAPY VISIT (OUTPATIENT)
Dept: PHYSICAL THERAPY | Facility: CLINIC | Age: 53
End: 2018-07-02
Payer: COMMERCIAL

## 2018-07-02 DIAGNOSIS — M25.512 LEFT SHOULDER PAIN: Primary | ICD-10-CM

## 2018-07-02 PROCEDURE — 97112 NEUROMUSCULAR REEDUCATION: CPT | Mod: GP | Performed by: PHYSICAL THERAPIST

## 2018-07-02 PROCEDURE — 97140 MANUAL THERAPY 1/> REGIONS: CPT | Mod: GP | Performed by: PHYSICAL THERAPIST

## 2018-08-06 ENCOUNTER — THERAPY VISIT (OUTPATIENT)
Dept: PHYSICAL THERAPY | Facility: CLINIC | Age: 53
End: 2018-08-06
Payer: COMMERCIAL

## 2018-08-06 DIAGNOSIS — M25.512 LEFT SHOULDER PAIN: ICD-10-CM

## 2018-08-06 PROCEDURE — 97112 NEUROMUSCULAR REEDUCATION: CPT | Mod: GP | Performed by: PHYSICAL THERAPIST

## 2018-08-06 NOTE — LETTER
[facility]  [address], [place], operated by [facility] [address]  [phone]    2018    Re: Kasia Macedo   :   1965  MRN:  [id number]   REFERRING PHYSICIAN:   Nel Donaldson    [facility]    Date of Initial Evaluation:  2018  Visits:  6  Rxs Used: 6  Reason for Referral:  Left shoulder pain    DISCHARGE REPORT    Progress reporting period is from  2018 to 2018.       SUBJECTIVE  Subjective changes noted by patient:  Overall improvement has been significant..       Current pain level is 2/10 pain level  Previous pain level was  5/10  Changes in function:  Yes (See Goal flowsheet attached for changes in current functional level)  Adverse reaction to treatment or activity: None    OBJECTIVE  Changes noted in objective findings:    Yes, Shoulder Evaluation:  ROM:  AROM:    Flexion:  Left:    180    Right:  WNL  Extension: Left: WNLRight: WNL  Abduction:  Left:  180  Right:  WNl  Internal Rotation:  Left:    WNL    Right:  WNL  External Rotation:  Left:    WNL   Right:  WNL  Elbow Flexion:  Left:  WNL    Right:  WNL  Elbow Extension:  Left:  WNl   Right:  WNl  Strength:    Flexion: Left:5/5   Pain:    Right: 5/5     Pain:   Extension:  Left:  5/5    Pain:    Right: 5/5    Pain:  Abduction:  Left:  5/5  Pain:    Right: 5/5     Pain:  Internal Rotation:  Left: 5/5      Pain:    Right: 5/5     Pain:  External Rotation:   Left: 5-/5     Pain:   Right: 5-/5     Pain:    Elbow Flexion:  Left:5/5     Pain:    Right:5/5     Pain:  Stability Testing:  not assessed          Re: Kasia Macedo   :   1965      Special Tests:    Left shoulder negative for the following special tests:  Bursal  caleb/ left posterior rotator cuff muscle tone , moderate decrease in tightness  levator scapula and upper trapezius muscles, General     ASSESSMENT/PLAN  Updated problem list and treatment plan: Diagnosis 1:  1:  Left shoulder  pain  Pain -  hot/cold therapy, manual therapy, self management, education, directional preference exercise and home program  Decreased ROM/flexibility - manual therapy, therapeutic exercise, therapeutic activity and home program  Decreased strength - therapeutic exercise, therapeutic activities and home program  Impaired muscle performance - neuro re-education and home program  Decreased function - therapeutic activities and home program    STG/LTGs have been met or progress has been made towards goals:  Yes (See Goal flow sheet completed today.)  Assessment of Progress: The patient's condition is improving.  Self Management Plans:  Patient has been instructed in a home treatment program.  Patient is independent in a home treatment program.  Patient  has been instructed in self management of symptoms.  Patient is independent in self management of symptoms.  I have re-evaluated this patient and find that the nature, scope, duration and intensity of the therapy is appropriate for the medical condition of the patient.  Kasia continues to require the following intervention to meet STG and LTG's:  PT intervention is no longer required to meet STG/LTG.    Recommendations:  This patient is ready to be discharged from therapy and continue their home treatment program.            Thank you for your referral.    INQUIRIES  Therapist: Iliana Delvalle PT  [facility] [address] [address]  Phone: [phone]  Fax: [phone]

## 2018-08-06 NOTE — MR AVS SNAPSHOT
After Visit Summary   8/6/2018    Kasia Macedo    MRN: [id number]           Patient Information     Date Of Birth          1965        Visit Information        Provider Department      8/6/2018 11:40 AM Iliana Delvalle PT [facility]        Today's Diagnoses     Left shoulder pain           Follow-ups after your visit        Who to contact     If you have questions or need follow up information about today's clinic visit or your schedule please contact [facility] directly at [phone].  Normal or non-critical lab and imaging results will be communicated to you by [employer], letter or phone within 4 business days after the clinic has received the results. If you do not hear from us within 7 days, please contact the clinic through [employer] or phone. If you have a critical or abnormal lab result, we will notify you by phone as soon as possible.  Submit refill requests through [employer] or call your pharmacy and they will forward the refill request to us. Please allow 3 business days for your refill to be completed.          Additional Information About Your Visit        MyChart Information     [employer] gives you secure access to your electronic health record. If you see a primary care provider, you can also send messages to your care team and make appointments. If you have questions, please call your primary care clinic.  If you do not have a primary care provider, please call [phone] and they will assist you.        Care EveryWhere ID     This is your Care EveryWhere ID. This could be used by other organizations to access your [place] medical records  [id number]        Your Vitals Were     Last Period                   08/21/2013            Blood Pressure from Last 3 Encounters:   11/05/17 102/73   12/22/14 (!) 148/114   08/28/13 112/62    Weight from Last 3 Encounters:   11/05/17 58.5 kg (129 lb)   12/22/14 61 kg (134 lb  6.4 oz)   08/28/13 61.7 kg (136 lb)              We Performed the Following     ROM Progress Notes Report     Neuromuscular Re-Education        Primary Care Provider Office Phone # Fax #    Gisella Durand [phone]       [place] [address]        Equal Access to Services     [facility]: Hadii aad ku hadasho Soomaali, waaxda luqadaha, qaybta kaalmada adeegyada, waxay idiin hayaan adeeg kharash la'aan . So [facility] [phone].    ATENCIÓN: Si habla español, tiene a leal disposición servicios gratuitos de asistencia lingüística. [facility] [phone].    We comply with applicable federal civil rights laws and [place] laws. We do not discriminate on the basis of race, color, national origin, age, disability, sex, sexual orientation, or gender identity.            Thank you!     Thank you for choosing [facility] [place]  for your care. Our goal is always to provide you with excellent care. Hearing back from our patients is one way we can continue to improve our services. Please take a few minutes to complete the written survey that you may receive in the mail after your visit with us. Thank you!             Your Updated Medication List - Protect others around you: Learn how to safely use, store and throw away your medicines at www.disposemymeds.org.          This list is accurate as of 8/6/18 12:33 PM.  Always use your most recent med list.                   Brand Name Dispense Instructions for use Diagnosis    ACIDOPHILUS PO      Take  by mouth. 2 tablets daily        azithromycin 250 MG tablet    ZITHROMAX    6 tablet    Two tablets first day, then one tablet daily for four days.    Bronchitis       FISH OIL PO      Take  by mouth. 2 capsules daily        IBUPROFEN PO

## 2019-05-13 ENCOUNTER — OFFICE VISIT - HEALTHEAST (OUTPATIENT)
Dept: FAMILY MEDICINE | Facility: CLINIC | Age: 54
End: 2019-05-13

## 2019-05-13 DIAGNOSIS — Z12.31 VISIT FOR SCREENING MAMMOGRAM: ICD-10-CM

## 2019-05-13 DIAGNOSIS — E05.00 GRAVES DISEASE: ICD-10-CM

## 2019-05-13 LAB
T3FREE SERPL-MCNC: 2.7 PG/ML (ref 1.9–3.9)
T4 FREE SERPL-MCNC: 0.9 NG/DL (ref 0.7–1.8)
TSH SERPL DL<=0.005 MIU/L-ACNC: 1.64 UIU/ML (ref 0.3–5)

## 2019-05-13 ASSESSMENT — MIFFLIN-ST. JEOR: SCORE: 1157.64

## 2019-05-28 ENCOUNTER — COMMUNICATION - HEALTHEAST (OUTPATIENT)
Dept: FAMILY MEDICINE | Facility: CLINIC | Age: 54
End: 2019-05-28

## 2019-05-29 ENCOUNTER — COMMUNICATION - HEALTHEAST (OUTPATIENT)
Dept: FAMILY MEDICINE | Facility: CLINIC | Age: 54
End: 2019-05-29

## 2019-06-07 ENCOUNTER — COMMUNICATION - HEALTHEAST (OUTPATIENT)
Dept: FAMILY MEDICINE | Facility: CLINIC | Age: 54
End: 2019-06-07

## 2019-06-07 ENCOUNTER — AMBULATORY - HEALTHEAST (OUTPATIENT)
Dept: FAMILY MEDICINE | Facility: CLINIC | Age: 54
End: 2019-06-07

## 2019-06-07 DIAGNOSIS — R22.30 NODULE OF FINGER, UNSPECIFIED LATERALITY: ICD-10-CM

## 2019-06-27 ENCOUNTER — AMBULATORY - HEALTHEAST (OUTPATIENT)
Dept: LAB | Facility: CLINIC | Age: 54
End: 2019-06-27

## 2019-06-27 DIAGNOSIS — R22.30 NODULE OF FINGER, UNSPECIFIED LATERALITY: ICD-10-CM

## 2019-06-27 LAB
ALBUMIN SERPL-MCNC: 4 G/DL (ref 3.5–5)
ALP SERPL-CCNC: 53 U/L (ref 45–120)
ALT SERPL W P-5'-P-CCNC: 14 U/L (ref 0–45)
ANION GAP SERPL CALCULATED.3IONS-SCNC: 10 MMOL/L (ref 5–18)
AST SERPL W P-5'-P-CCNC: 23 U/L (ref 0–40)
BASOPHILS # BLD AUTO: 0 THOU/UL (ref 0–0.2)
BASOPHILS NFR BLD AUTO: 1 % (ref 0–2)
BILIRUB SERPL-MCNC: 0.3 MG/DL (ref 0–1)
BUN SERPL-MCNC: 10 MG/DL (ref 8–22)
C3 SERPL-MCNC: 137 MG/DL (ref 83–177)
C4 SERPL-MCNC: 29 MG/DL (ref 19–59)
CALCIUM SERPL-MCNC: 10.2 MG/DL (ref 8.5–10.5)
CHLORIDE BLD-SCNC: 102 MMOL/L (ref 98–107)
CO2 SERPL-SCNC: 29 MMOL/L (ref 22–31)
CREAT SERPL-MCNC: 0.7 MG/DL (ref 0.6–1.1)
EOSINOPHIL # BLD AUTO: 0.1 THOU/UL (ref 0–0.4)
EOSINOPHIL NFR BLD AUTO: 2 % (ref 0–6)
ERYTHROCYTE [DISTWIDTH] IN BLOOD BY AUTOMATED COUNT: 11.6 % (ref 11–14.5)
GFR SERPL CREATININE-BSD FRML MDRD: >60 ML/MIN/1.73M2
GLUCOSE BLD-MCNC: 83 MG/DL (ref 70–125)
HCT VFR BLD AUTO: 38.1 % (ref 35–47)
HGB BLD-MCNC: 13 G/DL (ref 12–16)
LYMPHOCYTES # BLD AUTO: 1.9 THOU/UL (ref 0.8–4.4)
LYMPHOCYTES NFR BLD AUTO: 33 % (ref 20–40)
MCH RBC QN AUTO: 31.7 PG (ref 27–34)
MCHC RBC AUTO-ENTMCNC: 34.2 G/DL (ref 32–36)
MCV RBC AUTO: 93 FL (ref 80–100)
MONOCYTES # BLD AUTO: 0.5 THOU/UL (ref 0–0.9)
MONOCYTES NFR BLD AUTO: 8 % (ref 2–10)
NEUTROPHILS # BLD AUTO: 3.2 THOU/UL (ref 2–7.7)
NEUTROPHILS NFR BLD AUTO: 56 % (ref 50–70)
PLATELET # BLD AUTO: 205 THOU/UL (ref 140–440)
PMV BLD AUTO: 7.8 FL (ref 7–10)
POTASSIUM BLD-SCNC: 4.6 MMOL/L (ref 3.5–5)
PROT SERPL-MCNC: 7 G/DL (ref 6–8)
RBC # BLD AUTO: 4.12 MILL/UL (ref 3.8–5.4)
RHEUMATOID FACT SERPL-ACNC: <15 IU/ML (ref 0–30)
SODIUM SERPL-SCNC: 141 MMOL/L (ref 136–145)
WBC: 5.8 THOU/UL (ref 4–11)

## 2019-07-01 LAB — ANA SER QL: 0.2 U

## 2019-07-03 ENCOUNTER — COMMUNICATION - HEALTHEAST (OUTPATIENT)
Dept: FAMILY MEDICINE | Facility: CLINIC | Age: 54
End: 2019-07-03

## 2019-07-25 ENCOUNTER — HOSPITAL ENCOUNTER (OUTPATIENT)
Dept: MAMMOGRAPHY | Facility: CLINIC | Age: 54
Discharge: HOME | End: 2019-07-25
Attending: FAMILY MEDICINE

## 2019-07-25 DIAGNOSIS — Z12.31 VISIT FOR SCREENING MAMMOGRAM: ICD-10-CM

## 2019-09-24 ENCOUNTER — COMMUNICATION - HEALTHEAST (OUTPATIENT)
Dept: SCHEDULING | Facility: CLINIC | Age: 54
End: 2019-09-24

## 2019-09-24 DIAGNOSIS — Z20.7 EXPOSURE TO SCABIES: ICD-10-CM

## 2019-11-13 ENCOUNTER — COMMUNICATION - HEALTHEAST (OUTPATIENT)
Dept: SCHEDULING | Facility: CLINIC | Age: 54
End: 2019-11-13

## 2019-11-13 ENCOUNTER — COMMUNICATION - HEALTHEAST (OUTPATIENT)
Dept: FAMILY MEDICINE | Facility: CLINIC | Age: 54
End: 2019-11-13

## 2019-11-13 DIAGNOSIS — Z20.7 EXPOSURE TO SCABIES: ICD-10-CM

## 2020-02-10 ENCOUNTER — OFFICE VISIT - HEALTHEAST (OUTPATIENT)
Dept: FAMILY MEDICINE | Facility: CLINIC | Age: 55
End: 2020-02-10

## 2020-02-10 DIAGNOSIS — H93.12 TINNITUS, LEFT: ICD-10-CM

## 2020-02-10 DIAGNOSIS — E05.00 GRAVES DISEASE: ICD-10-CM

## 2020-02-10 DIAGNOSIS — N18.1 STAGE 1 CHRONIC KIDNEY DISEASE: ICD-10-CM

## 2020-02-10 DIAGNOSIS — Z00.00 ROUTINE GENERAL MEDICAL EXAMINATION AT A HEALTH CARE FACILITY: ICD-10-CM

## 2020-02-10 DIAGNOSIS — Z13.228 ENCOUNTER FOR SCREENING FOR OTHER METABOLIC DISORDERS: ICD-10-CM

## 2020-02-10 DIAGNOSIS — M25.511 CHRONIC RIGHT SHOULDER PAIN: ICD-10-CM

## 2020-02-10 DIAGNOSIS — G89.29 CHRONIC RIGHT-SIDED THORACIC BACK PAIN: ICD-10-CM

## 2020-02-10 DIAGNOSIS — E55.9 VITAMIN D DEFICIENCY: ICD-10-CM

## 2020-02-10 DIAGNOSIS — M54.6 CHRONIC RIGHT-SIDED THORACIC BACK PAIN: ICD-10-CM

## 2020-02-10 DIAGNOSIS — Z12.4 SCREENING FOR CERVICAL CANCER: ICD-10-CM

## 2020-02-10 DIAGNOSIS — G89.29 CHRONIC RIGHT SHOULDER PAIN: ICD-10-CM

## 2020-02-10 LAB
ALBUMIN UR-MCNC: ABNORMAL MG/DL
APPEARANCE UR: CLEAR
BACTERIA #/AREA URNS HPF: ABNORMAL HPF
BILIRUB UR QL STRIP: NEGATIVE
CHOLEST SERPL-MCNC: 345 MG/DL
COLOR UR AUTO: YELLOW
FASTING STATUS PATIENT QL REPORTED: YES
GLUCOSE UR STRIP-MCNC: NEGATIVE MG/DL
HDLC SERPL-MCNC: 84 MG/DL
HGB UR QL STRIP: ABNORMAL
KETONES UR STRIP-MCNC: NEGATIVE MG/DL
LDLC SERPL CALC-MCNC: 241 MG/DL
LEUKOCYTE ESTERASE UR QL STRIP: NEGATIVE
MUCOUS THREADS #/AREA URNS LPF: ABNORMAL LPF
NITRATE UR QL: NEGATIVE
PH UR STRIP: 7 [PH] (ref 5–8)
RBC #/AREA URNS AUTO: ABNORMAL HPF
SP GR UR STRIP: 1.02 (ref 1–1.03)
SQUAMOUS #/AREA URNS AUTO: ABNORMAL LPF
T3FREE SERPL-MCNC: 3.1 PG/ML (ref 1.9–3.9)
T4 FREE SERPL-MCNC: 0.9 NG/DL (ref 0.7–1.8)
TRIGL SERPL-MCNC: 99 MG/DL
TSH SERPL DL<=0.005 MIU/L-ACNC: 1.58 UIU/ML (ref 0.3–5)
UROBILINOGEN UR STRIP-ACNC: ABNORMAL
WBC #/AREA URNS AUTO: ABNORMAL HPF

## 2020-02-10 ASSESSMENT — MIFFLIN-ST. JEOR: SCORE: 1167.62

## 2020-02-11 ENCOUNTER — COMMUNICATION - HEALTHEAST (OUTPATIENT)
Dept: FAMILY MEDICINE | Facility: CLINIC | Age: 55
End: 2020-02-11

## 2020-02-11 ENCOUNTER — COMMUNICATION - HEALTHEAST (OUTPATIENT)
Dept: INTERNAL MEDICINE | Facility: CLINIC | Age: 55
End: 2020-02-11

## 2020-02-11 LAB — 25(OH)D3 SERPL-MCNC: 30.8 NG/ML (ref 30–80)

## 2020-02-16 ENCOUNTER — COMMUNICATION - HEALTHEAST (OUTPATIENT)
Dept: FAMILY MEDICINE | Facility: CLINIC | Age: 55
End: 2020-02-16

## 2020-02-19 ENCOUNTER — OFFICE VISIT - HEALTHEAST (OUTPATIENT)
Dept: FAMILY MEDICINE | Facility: CLINIC | Age: 55
End: 2020-02-19

## 2020-02-19 DIAGNOSIS — Z12.4 SCREENING FOR CERVICAL CANCER: ICD-10-CM

## 2020-02-19 DIAGNOSIS — N02.9 THIN BASEMENT MEMBRANE DISEASE: ICD-10-CM

## 2020-02-19 DIAGNOSIS — E78.00 HYPERCHOLESTEROLEMIA: ICD-10-CM

## 2020-02-19 DIAGNOSIS — E63.9 NUTRITIONAL DEFICIENCY: ICD-10-CM

## 2020-02-19 LAB
ALBUMIN SERPL-MCNC: 3.9 G/DL (ref 3.5–5)
ALP SERPL-CCNC: 53 U/L (ref 45–120)
ALT SERPL W P-5'-P-CCNC: 12 U/L (ref 0–45)
ANION GAP SERPL CALCULATED.3IONS-SCNC: 10 MMOL/L (ref 5–18)
AST SERPL W P-5'-P-CCNC: 21 U/L (ref 0–40)
BILIRUB SERPL-MCNC: 0.4 MG/DL (ref 0–1)
BUN SERPL-MCNC: 12 MG/DL (ref 8–22)
CALCIUM SERPL-MCNC: 9.6 MG/DL (ref 8.5–10.5)
CHLORIDE BLD-SCNC: 101 MMOL/L (ref 98–107)
CO2 SERPL-SCNC: 28 MMOL/L (ref 22–31)
CREAT SERPL-MCNC: 0.63 MG/DL (ref 0.6–1.1)
FERRITIN SERPL-MCNC: 41 NG/ML (ref 10–130)
FIBRINOGEN PPP-MCNC: 425 MG/DL (ref 170–410)
GFR SERPL CREATININE-BSD FRML MDRD: >60 ML/MIN/1.73M2
GLUCOSE BLD-MCNC: 92 MG/DL (ref 70–125)
POTASSIUM BLD-SCNC: 4.2 MMOL/L (ref 3.5–5)
PROT SERPL-MCNC: 7 G/DL (ref 6–8)
SODIUM SERPL-SCNC: 139 MMOL/L (ref 136–145)

## 2020-02-20 ENCOUNTER — TRANSCRIBE ORDERS (OUTPATIENT)
Dept: OTHER | Age: 55
End: 2020-02-20

## 2020-02-20 DIAGNOSIS — N02.9 THIN BASEMENT MEMBRANE DISEASE: Primary | ICD-10-CM

## 2020-02-20 LAB
FASTING STATUS PATIENT QL REPORTED: YES
HOMOCYSTEINE PLASMA - HISTORICAL: 7 UMOL/L (ref 0–13)
HPV SOURCE: NORMAL
HUMAN PAPILLOMA VIRUS 16 DNA: NEGATIVE
HUMAN PAPILLOMA VIRUS 18 DNA: NEGATIVE
HUMAN PAPILLOMA VIRUS FINAL DIAGNOSIS: NORMAL
HUMAN PAPILLOMA VIRUS OTHER HR: NEGATIVE
LIPOPROTEIN (A) - HISTORICAL: 69 MG/DL
SPECIMEN DESCRIPTION: NORMAL

## 2020-02-23 ENCOUNTER — COMMUNICATION - HEALTHEAST (OUTPATIENT)
Dept: FAMILY MEDICINE | Facility: CLINIC | Age: 55
End: 2020-02-23

## 2020-02-24 ENCOUNTER — TELEPHONE (OUTPATIENT)
Dept: NEPHROLOGY | Facility: CLINIC | Age: 55
End: 2020-02-24

## 2020-02-24 NOTE — TELEPHONE ENCOUNTER
[facility]    Phone Message    May a detailed message be left on voicemail: yes     Reason for Call: Other: Pt Referred by Dr. Gisella Durand at [facility] - thin basement membrane disease. It appears patient may have had a transplant? Last seen in clinic 12/22/2014 as a NEW KIDNEY TRANSPLANT     Action Taken: Message routed to:  [facility]    Travel Screening: Not Applicable

## 2020-03-03 NOTE — TELEPHONE ENCOUNTER
[facility] Call Center    Phone Message    May a detailed message be left on voicemail: yes     Reason for Call: Other: Patient is calling in to schedule with a provider for Thin basement membrane disease. She has not been seen in 5 years and patient stated she is not a transplant patient she has not had a transplant. [id number] follow up with the patient to schedule the appointment. Thank you.     Action Taken: Message routed to:  [facility] neph    Travel Screening: Not Applicable

## 2020-03-04 ENCOUNTER — OFFICE VISIT - HEALTHEAST (OUTPATIENT)
Dept: PHYSICAL THERAPY | Facility: REHABILITATION | Age: 55
End: 2020-03-04

## 2020-03-04 DIAGNOSIS — M25.511 CHRONIC RIGHT SHOULDER PAIN: ICD-10-CM

## 2020-03-04 DIAGNOSIS — G89.29 ELBOW PAIN, CHRONIC, RIGHT: ICD-10-CM

## 2020-03-04 DIAGNOSIS — G89.29 CHRONIC RIGHT SHOULDER PAIN: ICD-10-CM

## 2020-03-04 DIAGNOSIS — M25.521 ELBOW PAIN, CHRONIC, RIGHT: ICD-10-CM

## 2020-03-04 DIAGNOSIS — M25.611 DECREASED RANGE OF MOTION OF RIGHT SHOULDER: ICD-10-CM

## 2020-03-04 DIAGNOSIS — R29.898 DECREASED RANGE OF MOTION OF NECK: ICD-10-CM

## 2020-03-09 NOTE — TELEPHONE ENCOUNTER
[facility] Call Center    Phone Message    May a detailed message be left on voicemail: yes     Reason for Call: Other: Pt calling in  to see about the referral. Please follow up w8ith pt when available as she is still waiting. Thank you    Action Taken: Message routed to:  [facility] Nephrology     Travel Screening: Not Applicable

## 2020-03-11 NOTE — TELEPHONE ENCOUNTER
RECORDS RECEIVED FROM: Internal   DATE RECEIVED: 4.2.2020   NOTES STATUS DETAILS   OFFICE NOTE from referring provider Internal 2.20.20 Dr. Durand,  [facility]  2.19.2020   OFFICE NOTE from other specialist  N/A    *Only VASCULITIS or LUPUS gather office notes for the following N/A    *PULMONARY   N/A    *ENT N/A    *DERMATOLOGY N/A    *RHEUMATOLOGY N/A    DISCHARGE SUMMARY from hospital N/A    DISCHARGE REPORT from the ER N/A    MEDICATION LIST Internal    IMAGING  (NEED IMAGES AND REPORTS)     KIDNEY CT SCAN N/A    KIDNEY ULTRASOUND N/A    MR ABDOMEN N/A    NUCLEAR MEDICINE RENAL N/A    LABS     CBC Internal 9.12.12   CMP Internal 2.19.2020   BMP Internal 8.28.13   UA Internal 2.10.20   URINE PROTEIN N/A    RENAL PANEL N/A    BIOPSY     KIDNEY BIOPSY  N/A

## 2020-04-02 ENCOUNTER — PRE VISIT (OUTPATIENT)
Dept: NEPHROLOGY | Facility: CLINIC | Age: 55
End: 2020-04-02

## 2020-04-08 ENCOUNTER — COMMUNICATION - HEALTHEAST (OUTPATIENT)
Dept: PHYSICAL THERAPY | Facility: REHABILITATION | Age: 55
End: 2020-04-08

## 2020-04-27 ENCOUNTER — DOCUMENTATION ONLY (OUTPATIENT)
Dept: CARE COORDINATION | Facility: CLINIC | Age: 55
End: 2020-04-27

## 2020-06-10 DIAGNOSIS — N02.9 THIN BASEMENT MEMBRANE DISEASE: Primary | ICD-10-CM

## 2020-06-24 DIAGNOSIS — N02.9 THIN BASEMENT MEMBRANE DISEASE: ICD-10-CM

## 2020-06-24 LAB
ALBUMIN SERPL-MCNC: 3.6 G/DL (ref 3.4–5)
ALBUMIN UR-MCNC: NEGATIVE MG/DL
ANION GAP SERPL CALCULATED.3IONS-SCNC: 3 MMOL/L (ref 3–14)
APPEARANCE UR: CLEAR
BACTERIA #/AREA URNS HPF: ABNORMAL /HPF
BILIRUB UR QL STRIP: NEGATIVE
BUN SERPL-MCNC: 10 MG/DL (ref 7–30)
CALCIUM SERPL-MCNC: 9.1 MG/DL (ref 8.5–10.1)
CHLORIDE SERPL-SCNC: 106 MMOL/L (ref 94–109)
CO2 SERPL-SCNC: 29 MMOL/L (ref 20–32)
COLOR UR AUTO: YELLOW
CREAT SERPL-MCNC: 0.6 MG/DL (ref 0.52–1.04)
ERYTHROCYTE [DISTWIDTH] IN BLOOD BY AUTOMATED COUNT: 12.5 % (ref 10–15)
GFR SERPL CREATININE-BSD FRML MDRD: >90 ML/MIN/{1.73_M2}
GLUCOSE SERPL-MCNC: 88 MG/DL (ref 70–99)
GLUCOSE UR STRIP-MCNC: NEGATIVE MG/DL
HCT VFR BLD AUTO: 39.9 % (ref 35–47)
HGB BLD-MCNC: 13.3 G/DL (ref 11.7–15.7)
HGB UR QL STRIP: ABNORMAL
KETONES UR STRIP-MCNC: NEGATIVE MG/DL
LEUKOCYTE ESTERASE UR QL STRIP: NEGATIVE
MCH RBC QN AUTO: 31.3 PG (ref 26.5–33)
MCHC RBC AUTO-ENTMCNC: 33.3 G/DL (ref 31.5–36.5)
MCV RBC AUTO: 94 FL (ref 78–100)
NITRATE UR QL: NEGATIVE
NON-SQ EPI CELLS #/AREA URNS LPF: ABNORMAL /LPF
PH UR STRIP: 6 PH (ref 5–7)
PHOSPHATE SERPL-MCNC: 3.7 MG/DL (ref 2.5–4.5)
PLATELET # BLD AUTO: 193 10E9/L (ref 150–450)
POTASSIUM SERPL-SCNC: 3.9 MMOL/L (ref 3.4–5.3)
PTH-INTACT SERPL-MCNC: 45 PG/ML (ref 18–80)
RBC # BLD AUTO: 4.25 10E12/L (ref 3.8–5.2)
RBC #/AREA URNS AUTO: ABNORMAL /HPF
SODIUM SERPL-SCNC: 138 MMOL/L (ref 133–144)
SOURCE: ABNORMAL
SP GR UR STRIP: 1.02 (ref 1–1.03)
UROBILINOGEN UR STRIP-ACNC: 0.2 EU/DL (ref 0.2–1)
WBC # BLD AUTO: 6 10E9/L (ref 4–11)
WBC #/AREA URNS AUTO: ABNORMAL /HPF

## 2020-06-24 PROCEDURE — 84156 ASSAY OF PROTEIN URINE: CPT | Performed by: INTERNAL MEDICINE

## 2020-06-24 PROCEDURE — 81001 URINALYSIS AUTO W/SCOPE: CPT | Performed by: INTERNAL MEDICINE

## 2020-06-24 PROCEDURE — 80069 RENAL FUNCTION PANEL: CPT | Performed by: INTERNAL MEDICINE

## 2020-06-24 PROCEDURE — 36415 COLL VENOUS BLD VENIPUNCTURE: CPT | Performed by: INTERNAL MEDICINE

## 2020-06-24 PROCEDURE — 82306 VITAMIN D 25 HYDROXY: CPT | Performed by: INTERNAL MEDICINE

## 2020-06-24 PROCEDURE — 83970 ASSAY OF PARATHORMONE: CPT | Performed by: INTERNAL MEDICINE

## 2020-06-24 PROCEDURE — 85027 COMPLETE CBC AUTOMATED: CPT | Performed by: INTERNAL MEDICINE

## 2020-06-25 ENCOUNTER — RECORDS - HEALTHEAST (OUTPATIENT)
Dept: ADMINISTRATIVE | Facility: OTHER | Age: 55
End: 2020-06-25

## 2020-06-25 ENCOUNTER — VIRTUAL VISIT (OUTPATIENT)
Dept: NEPHROLOGY | Facility: CLINIC | Age: 55
End: 2020-06-25
Attending: FAMILY MEDICINE
Payer: COMMERCIAL

## 2020-06-25 DIAGNOSIS — R31.29 MICROSCOPIC HEMATURIA: ICD-10-CM

## 2020-06-25 LAB
DEPRECATED CALCIDIOL+CALCIFEROL SERPL-MC: 44 UG/L (ref 20–75)
PROT UR-MCNC: 0.16 G/L
PROT/CREAT 24H UR: 0.13 G/G CR (ref 0–0.2)

## 2020-06-25 NOTE — PROGRESS NOTES
"Kasia Macedo is a 55 year old female who is being evaluated via a billable video visit.      The patient has been notified of following:     \"This video visit will be conducted via a call between you and your physician/provider. We have found that certain health care needs can be provided without the need for an in-person physical exam.  This service lets us provide the care you need with a video conversation.  If a prescription is necessary we can send it directly to your pharmacy.  If lab work is needed we can place an order for that and you can then stop by our lab to have the test done at a later time.    Video visits are billed at different rates depending on your insurance coverage.  Please reach out to your insurance provider with any questions.    If during the course of the call the physician/provider feels a video visit is not appropriate, you will not be charged for this service.\"    Patient has given verbal consent for Video visit? Yes    Will anyone else be joining your video visit? No        Video-Visit Details    Type of service:  Video Visit    Total video time: 15 min    Originating Location (pt. Location): Home    Distant Location (provider location):  [facility] NEPHROLOGY     Platform used for Video Visit: Wesley Cabrera MD    Interval History: She was followed in the nephrology clinic back in 2014 by Dr Luu for microscopic hematuria. She was first told she had microscopic hematuria back when she was pregnant. She has remained asymptomatic since without other concerning factors such as proteinuria, elevated creatinine or hypertension. She had initial w /up for glomerular disease which was negative. She recently had labs done with her PCP and was found to have protein of 30 mg/dl on her UA which was never present before and hence a nephrology f/u was recommended.    She has had extensive urological workup in 1984 and also more recently with no explanation for this hematuria.  " She does have a brother who has microscopic hematuria and at one time point one of her daughters had hematuria.    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 She did have proteinuria in 2/20 which was not quantified, however most recent UA us without proteinuria,     Plan:    --Continue to follow with PCP  --Continue to check UA yearly   --Monitor blood pressures   --No indication of kidney bx at this time.     Zeina Cabrera MD

## 2020-06-25 NOTE — LETTER
6/25/2020       RE: Kasia Macedo  [address]  [facility] [address]     Dear Colleague,    Thank you for referring your patient, Kasia Macedo, to the [facility] NEPHROLOGY at [facility]. Please see a copy of my visit note below.    Kasia Macedo is a 55 year old female who is being evaluated via a billable video visit.        Video-Visit Details    Type of service:  Video Visit    Total video time: 15 min    Originating Location (pt. Location): Home    Distant Location (provider location):  [facility] NEPHROLOGY     Platform used for Video Visit: Wesley Cabrera MD    Interval History: She was followed in the nephrology clinic back in 2014 by Dr Luu for microscopic hematuria. She was first told she had microscopic hematuria back when she was pregnant. She has remained asymptomatic since without other concerning factors such as proteinuria, elevated creatinine or hypertension. She had initial w /up for glomerular disease which was negative. She recently had labs done with her PCP and was found to have protein of 30 mg/dl on her UA which was never present before and hence a nephrology f/u was recommended.    She has had extensive urological workup in 1984 and also more recently with no explanation for this hematuria.  She does have a brother who has microscopic hematuria and at one time point one of her daughters had hematuria.    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 She did have proteinuria in 2/20 which was not quantified, however most recent UA us without proteinuria,     Plan:    --Continue to follow with PCP  --Continue to check UA yearly   --Monitor blood pressures   --No indication of kidney bx at this time.     Zeina  KRISTAN Cabrera MD

## 2020-07-24 PROBLEM — R31.29 MICROSCOPIC HEMATURIA: Status: ACTIVE | Noted: 2020-07-24

## 2020-08-12 ENCOUNTER — OFFICE VISIT - HEALTHEAST (OUTPATIENT)
Dept: PHYSICAL THERAPY | Facility: REHABILITATION | Age: 55
End: 2020-08-12

## 2020-08-12 DIAGNOSIS — R29.898 DECREASED RANGE OF MOTION OF NECK: ICD-10-CM

## 2020-08-12 DIAGNOSIS — M25.511 CHRONIC RIGHT SHOULDER PAIN: ICD-10-CM

## 2020-08-12 DIAGNOSIS — M25.611 DECREASED RANGE OF MOTION OF RIGHT SHOULDER: ICD-10-CM

## 2020-08-12 DIAGNOSIS — G89.29 ELBOW PAIN, CHRONIC, RIGHT: ICD-10-CM

## 2020-08-12 DIAGNOSIS — M25.521 ELBOW PAIN, CHRONIC, RIGHT: ICD-10-CM

## 2020-08-12 DIAGNOSIS — G89.29 CHRONIC RIGHT SHOULDER PAIN: ICD-10-CM

## 2020-08-21 ENCOUNTER — OFFICE VISIT - HEALTHEAST (OUTPATIENT)
Dept: PHYSICAL THERAPY | Facility: REHABILITATION | Age: 55
End: 2020-08-21

## 2020-08-21 DIAGNOSIS — G89.29 CHRONIC LOW BACK PAIN WITH LEFT-SIDED SCIATICA: ICD-10-CM

## 2020-08-21 DIAGNOSIS — M25.611 DECREASED RANGE OF MOTION OF RIGHT SHOULDER: ICD-10-CM

## 2020-08-21 DIAGNOSIS — G89.29 ELBOW PAIN, CHRONIC, RIGHT: ICD-10-CM

## 2020-08-21 DIAGNOSIS — R29.898 DECREASED RANGE OF MOTION OF NECK: ICD-10-CM

## 2020-08-21 DIAGNOSIS — G89.29 CHRONIC RIGHT SHOULDER PAIN: ICD-10-CM

## 2020-08-21 DIAGNOSIS — M25.511 CHRONIC RIGHT SHOULDER PAIN: ICD-10-CM

## 2020-08-21 DIAGNOSIS — M54.42 CHRONIC LOW BACK PAIN WITH LEFT-SIDED SCIATICA: ICD-10-CM

## 2020-08-21 DIAGNOSIS — M25.521 ELBOW PAIN, CHRONIC, RIGHT: ICD-10-CM

## 2020-09-09 ENCOUNTER — OFFICE VISIT - HEALTHEAST (OUTPATIENT)
Dept: PHYSICAL THERAPY | Facility: REHABILITATION | Age: 55
End: 2020-09-09

## 2020-09-09 DIAGNOSIS — M25.521 ELBOW PAIN, CHRONIC, RIGHT: ICD-10-CM

## 2020-09-09 DIAGNOSIS — G89.29 CHRONIC LEFT-SIDED LOW BACK PAIN WITH LEFT-SIDED SCIATICA: ICD-10-CM

## 2020-09-09 DIAGNOSIS — M54.42 CHRONIC LEFT-SIDED LOW BACK PAIN WITH LEFT-SIDED SCIATICA: ICD-10-CM

## 2020-09-09 DIAGNOSIS — G89.29 CHRONIC RIGHT SHOULDER PAIN: ICD-10-CM

## 2020-09-09 DIAGNOSIS — M25.511 CHRONIC RIGHT SHOULDER PAIN: ICD-10-CM

## 2020-09-09 DIAGNOSIS — R29.898 DECREASED RANGE OF MOTION OF NECK: ICD-10-CM

## 2020-09-09 DIAGNOSIS — M25.611 DECREASED RANGE OF MOTION OF RIGHT SHOULDER: ICD-10-CM

## 2020-09-09 DIAGNOSIS — G89.29 ELBOW PAIN, CHRONIC, RIGHT: ICD-10-CM

## 2020-09-19 ENCOUNTER — VIRTUAL VISIT (OUTPATIENT)
Dept: FAMILY MEDICINE | Facility: OTHER | Age: 55
End: 2020-09-19

## 2020-09-19 ENCOUNTER — OFFICE VISIT (OUTPATIENT)
Dept: URGENT CARE | Facility: URGENT CARE | Age: 55
End: 2020-09-19
Payer: COMMERCIAL

## 2020-09-19 VITALS
HEIGHT: 62 IN | TEMPERATURE: 98.1 F | WEIGHT: 123 LBS | HEART RATE: 68 BPM | SYSTOLIC BLOOD PRESSURE: 101 MMHG | DIASTOLIC BLOOD PRESSURE: 70 MMHG | BODY MASS INDEX: 22.63 KG/M2 | OXYGEN SATURATION: 99 %

## 2020-09-19 DIAGNOSIS — H92.01 EAR PAIN, RIGHT: Primary | ICD-10-CM

## 2020-09-19 PROCEDURE — 99213 OFFICE O/P EST LOW 20 MIN: CPT | Performed by: INTERNAL MEDICINE

## 2020-09-19 RX ORDER — FLUTICASONE PROPIONATE 50 MCG
1 SPRAY, SUSPENSION (ML) NASAL 2 TIMES DAILY
Qty: 16 G | Refills: 0 | Status: SHIPPED | OUTPATIENT
Start: 2020-09-19 | End: 2020-09-29

## 2020-09-19 RX ORDER — NEOMYCIN SULFATE, POLYMYXIN B SULFATE, HYDROCORTISONE 3.5; 10000; 1 MG/ML; [USP'U]/ML; MG/ML
3 SOLUTION/ DROPS AURICULAR (OTIC) 4 TIMES DAILY
Qty: 10 ML | Refills: 0 | Status: SHIPPED | OUTPATIENT
Start: 2020-09-19 | End: 2021-06-08

## 2020-09-19 ASSESSMENT — ENCOUNTER SYMPTOMS
RHINORRHEA: 0
SHORTNESS OF BREATH: 0
DIARRHEA: 0
SLEEP DISTURBANCE: 0
FEVER: 0
DIAPHORESIS: 0
HEADACHES: 0
COUGH: 0
EYE DISCHARGE: 0
SORE THROAT: 0
ADENOPATHY: 0
CHILLS: 0
MYALGIAS: 0

## 2020-09-19 ASSESSMENT — MIFFLIN-ST. JEOR: SCORE: 1101.17

## 2020-09-19 NOTE — PATIENT INSTRUCTIONS
No middle ear infection  Pain at tragus suggest ear canal infection, but canal looks ok.    If these symptoms worsen, fill A prescription for ear antibiotics drops.    If sinus symptoms with ear congestion, then try flonase.

## 2020-09-19 NOTE — PROGRESS NOTES
"Date: 2020 07:35:19  Clinician: Arleen Cooper  Clinician NPI: [id number]  Patient: Kasia Macedo  Patient : 1965  Patient Address: [address] [place] [address]  Patient Phone: [phone]  Visit Protocol: URI  Patient Summary:  Kasia is a 55 year old ( : 1965 ) female who initiated a [facility] Visit for COVID-19 (Coronavirus) evaluation and screening. When asked the question \"Please sign me up to receive news, health information and promotions from [facility].\", Kasia responded \"No\".    Kasia states her symptoms started gradually 3-4 days ago.   Her symptoms consist of malaise and ear pain.   Kasia denies having chills, facial pain or pressure, fever, sore throat, teeth pain, ageusia, diarrhea, cough, nasal congestion, headache, anosmia, vomiting, rhinitis, nausea, wheezing, and myalgias. She also denies double sickening (worsening symptoms after initial improvement), taking antibiotic medication in the past month, and having recent facial or sinus surgery in the past 60 days. She is not experiencing dyspnea.    Pertinent COVID-19 (Coronavirus) information  In the past 14 days, Kasia has not worked in a congregate living setting.   She does not work or volunteer as healthcare worker or a  and does not work or volunteer in a healthcare facility.   Kasia also has not lived in a congregate living setting in the past 14 days. She does not live with a healthcare worker.   Kasia has not had a close contact with a laboratory-confirmed COVID-19 patient within 14 days of symptom onset.   Since 2019, Kasia and has not had upper respiratory infection or influenza-like illness. Has not been diagnosed with lab-confirmed COVID-19 test   Pertinent medical history  Kasia does not get yeast infections when she takes antibiotics.   Kasia does not need a return to work/school note.   Weight: 123 lbs   Kasia does not smoke or use smokeless " tobacco.   Additional information as reported by the patient (free text): My ear ache is in my right ear and it began 3 days ago.  My right nasal cavity has begun to feel irritated in the last 12 hours.   Weight: 123 lbs    MEDICATIONS: No current medications, ALLERGIES: NKDA  Clinician Response:  Deaeder Pedroza,  I am sorry you are not feeling well. To determine the most appropriate care for you, I would like you to be seen in person to further discuss your health history and symptoms.  You will not be charged for this [facility] Visit. Thank you for trusting us with your care.  COVID-19 (Coronavirus) General Information  Because there is currently no vaccine to prevent infection, the best way to protect yourself is to avoid being exposed to this virus. Common symptoms of COVID-19 include but are not limited to fever, cough, and shortness of breath. These symptoms appear 2-14 days after you are exposed to the virus that causes COVID-19. Click here for more information from the CDC on how to protect yourself.  If you are sick with COVID-19 or suspect you are infected with the virus that causes COVID-19, follow the steps here from the CDC to help prevent the disease from spreading to people in your home and community.  Click here for general information from the CDC on testing.  If you develop any of these emergency warning signs for COVID-19, get medical attention immediately:     Trouble breathing    Persistent pain or pressure in the chest    New confusion or inability to arouse    Bluish lips or face      Call your doctor or clinic before going in. Call 911 if you have a medical emergency and notify the  you have or think you may have COVID-19.  For more detailed and up to date information on COVID-19 (Coronavirus), please visit the CDC website.   Diagnosis: Refer for additional evaluation  Diagnosis ICD: R69

## 2020-09-19 NOTE — PROGRESS NOTES
SUBJECTIVE:   Kasia Macedo is a 55 year old female presenting with a chief complaint of   Chief Complaint   Patient presents with     Urgent Care     Pt in clinic to have eval for right ear pain.     Otalgia       She is an established patient of [facility].    Adult    Onset of symptoms was few day(s) ago.    Current and Associated symptoms: ear pain right  right sinus tenderness  No discharge  Treatment measures tried include None tried.  Predisposing factors include ill contact: no covid exposure  Mental health provider.        Review of Systems   Constitutional: Negative for chills, diaphoresis and fever.   HENT: Negative for rhinorrhea and sore throat.         No loss taste or smell   Eyes: Negative for discharge.   Respiratory: Negative for cough and shortness of breath.    Cardiovascular: Negative for chest pain.   Gastrointestinal: Negative for diarrhea.   Genitourinary: Negative for decreased urine volume.   Musculoskeletal: Negative for myalgias.   Skin: Negative for rash.   Allergic/Immunologic: Negative for environmental allergies.   Neurological: Negative for headaches.   Hematological: Negative for adenopathy.   Psychiatric/Behavioral: Negative for sleep disturbance.       Past Medical History:   Diagnosis Date     CARDIOVASCULAR SCREENING; LDL GOAL LESS THAN 160 6/1/2011     Family History   Problem Relation Age of Onset     Eye Disorder Mother         glaucoma     Cardiovascular Mother         angina     Alzheimer Disease Mother      Hypertension Father      Blood Disease Maternal Grandmother         lukemia     Eye Disorder Maternal Grandfather         glaucoma     Cerebrovascular Disease Maternal Grandfather      Eye Disorder Sister         detached retina     Eye Disorder Sister         glaucoma, suspect     Thyroid Disease Sister      Current Outpatient Medications   Medication Sig Dispense Refill     fluticasone (FLONASE) 50 MCG/ACT nasal spray Spray 1 spray into both nostrils 2 times daily  "for 10 days 16 g 0     Lactobacillus (ACIDOPHILUS PO) Take  by mouth. 2 tablets daily       neomycin-polymyxin-hydrocortisone (CORTISPORIN) 3.5-81795-8 otic solution Place 3 drops in ear(s) 4 times daily 10 mL 0     Omega-3 Fatty Acids (FISH OIL PO) Take  by mouth. 2 capsules daily       azithromycin (ZITHROMAX) 250 MG tablet Two tablets first day, then one tablet daily for four days. (Patient not taking: Reported on 9/19/2020) 6 tablet 0     IBUPROFEN PO        Social History     Tobacco Use     Smoking status: Never Smoker     Smokeless tobacco: Never Used   Substance Use Topics     Alcohol use: Yes     Comment: 1 drink a week       OBJECTIVE  /70   Pulse 68   Temp 98.1  F (36.7  C) (Oral)   Ht 1.575 m (5' 2\")   Wt 55.8 kg (123 lb)   LMP 08/21/2013   SpO2 99%   BMI 22.50 kg/m      Physical Exam  Vitals signs reviewed.   Constitutional:       Appearance: Normal appearance.   HENT:      Right Ear: Tympanic membrane, ear canal and external ear normal. There is no impacted cerumen.      Left Ear: Tympanic membrane, ear canal and external ear normal. There is no impacted cerumen.      Ears:      Comments: Pain with palpation right tragus.    NO pain at TMJ     Mouth/Throat:      Mouth: Mucous membranes are moist.      Pharynx: Oropharynx is clear.   Neurological:      Mental Status: She is alert.         Labs:  No results found for this or any previous visit (from the past 24 hour(s)).        ASSESSMENT:      ICD-10-CM    1. Ear pain, right  H92.01 neomycin-polymyxin-hydrocortisone (CORTISPORIN) 3.5-27392-0 otic solution     fluticasone (FLONASE) 50 MCG/ACT nasal spray        Medical Decision Making:      Patient Instructions   No middle ear infection  Pain at tragus suggest ear canal infection, but canal looks ok.    If these symptoms worsen, fill A prescription for ear antibiotics drops.    If sinus symptoms with ear congestion, then try flonase.              "

## 2020-10-20 ENCOUNTER — COMMUNICATION - HEALTHEAST (OUTPATIENT)
Dept: FAMILY MEDICINE | Facility: CLINIC | Age: 55
End: 2020-10-20

## 2020-10-20 DIAGNOSIS — E05.00 GRAVES DISEASE: ICD-10-CM

## 2020-10-20 DIAGNOSIS — E78.00 HYPERCHOLESTEROLEMIA: ICD-10-CM

## 2020-10-23 ENCOUNTER — AMBULATORY - HEALTHEAST (OUTPATIENT)
Dept: LAB | Facility: CLINIC | Age: 55
End: 2020-10-23

## 2020-10-23 DIAGNOSIS — E78.00 HYPERCHOLESTEROLEMIA: ICD-10-CM

## 2020-10-23 DIAGNOSIS — E05.00 GRAVES DISEASE: ICD-10-CM

## 2020-10-23 LAB
CHOLEST SERPL-MCNC: 296 MG/DL
FASTING STATUS PATIENT QL REPORTED: YES
HDLC SERPL-MCNC: 77 MG/DL
LDLC SERPL CALC-MCNC: 203 MG/DL
T3FREE SERPL-MCNC: 2.8 PG/ML (ref 1.9–3.9)
T4 FREE SERPL-MCNC: 1 NG/DL (ref 0.7–1.8)
TRIGL SERPL-MCNC: 81 MG/DL
TSH SERPL DL<=0.005 MIU/L-ACNC: 1.95 UIU/ML (ref 0.3–5)

## 2020-10-25 LAB — THYROGLOB AB SERPL-ACNC: 1.1 IU/ML (ref 0–4)

## 2020-10-27 ENCOUNTER — COMMUNICATION - HEALTHEAST (OUTPATIENT)
Dept: FAMILY MEDICINE | Facility: CLINIC | Age: 55
End: 2020-10-27

## 2020-10-27 LAB — THYROID PEROXIDASE ANTIBODIES - HISTORICAL: 491.2 IU/ML (ref 0–5.6)

## 2021-01-15 ENCOUNTER — HEALTH MAINTENANCE LETTER (OUTPATIENT)
Age: 56
End: 2021-01-15

## 2021-01-21 ENCOUNTER — OFFICE VISIT - HEALTHEAST (OUTPATIENT)
Dept: FAMILY MEDICINE | Facility: CLINIC | Age: 56
End: 2021-01-21

## 2021-01-21 ENCOUNTER — COMMUNICATION - HEALTHEAST (OUTPATIENT)
Dept: FAMILY MEDICINE | Facility: CLINIC | Age: 56
End: 2021-01-21

## 2021-01-21 DIAGNOSIS — Z13.228 ENCOUNTER FOR SCREENING FOR OTHER METABOLIC DISORDERS: ICD-10-CM

## 2021-01-21 DIAGNOSIS — R25.1 TREMOR: ICD-10-CM

## 2021-01-29 ENCOUNTER — AMBULATORY - HEALTHEAST (OUTPATIENT)
Dept: NURSING | Facility: CLINIC | Age: 56
End: 2021-01-29

## 2021-02-05 ENCOUNTER — AMBULATORY - HEALTHEAST (OUTPATIENT)
Dept: LAB | Facility: CLINIC | Age: 56
End: 2021-02-05

## 2021-02-05 DIAGNOSIS — Z13.228 ENCOUNTER FOR SCREENING FOR OTHER METABOLIC DISORDERS: ICD-10-CM

## 2021-02-05 DIAGNOSIS — R25.1 TREMOR: ICD-10-CM

## 2021-02-05 LAB
CHOLEST SERPL-MCNC: 326 MG/DL
FASTING STATUS PATIENT QL REPORTED: YES
HDLC SERPL-MCNC: 74 MG/DL
LDLC SERPL CALC-MCNC: 231 MG/DL
T3FREE SERPL-MCNC: 2.5 PG/ML (ref 1.9–3.9)
T4 FREE SERPL-MCNC: 1 NG/DL (ref 0.7–1.8)
TRIGL SERPL-MCNC: 103 MG/DL
TSH SERPL DL<=0.005 MIU/L-ACNC: 1.96 UIU/ML (ref 0.3–5)

## 2021-02-08 ENCOUNTER — COMMUNICATION - HEALTHEAST (OUTPATIENT)
Dept: FAMILY MEDICINE | Facility: CLINIC | Age: 56
End: 2021-02-08

## 2021-02-08 DIAGNOSIS — R25.1 TREMOR: ICD-10-CM

## 2021-02-19 ENCOUNTER — AMBULATORY - HEALTHEAST (OUTPATIENT)
Dept: NURSING | Facility: CLINIC | Age: 56
End: 2021-02-19

## 2021-02-25 ENCOUNTER — TRANSCRIBE ORDERS (OUTPATIENT)
Dept: OTHER | Age: 56
End: 2021-02-25

## 2021-02-25 DIAGNOSIS — R25.1 TREMOR: Primary | ICD-10-CM

## 2021-05-28 NOTE — PROGRESS NOTES
"SUBJECTIVE: Kasia Macedo is a 54 y.o. female with:  Chief Complaint   Patient presents with     Arm Pain     left side arm pain x few months     She is here with intermittent bump on fingers.  Started 10 years ago.  More frequently now.  Getting episodes where she will get a small bump on her finger.  Will get 5 mm bump on volar finger at finger joint that just pops up and is very transient.  Not painful or itchy.  Will last for minutes.  Afterwards she can get cold and notices a pressure in biceps and hands will feel cold.  No obvious trigger.  No hives.  No bumps anywhere else on body.      She is due for a mammogram.  Has dense breasts so gets 3D screening. Last mammo was in 2016.  No breast concerns.    She has thyroid antibodies.  Feels well but would like her TFTs checked today.    OBJECTIVE: /70 (Patient Site: Left Arm, Patient Position: Sitting, Cuff Size: Adult Regular)   Pulse 62   Ht 5' 2.5\" (1.588 m)   Wt 134 lb 12.8 oz (61.1 kg)   LMP 06/01/2016   BMI 24.26 kg/m   no distress  Hands: Normal joints.  Normal range of motion of fingers.  No lesions/ rash.    Kasia was seen today for arm pain.    Diagnoses and all orders for this visit:    Bump- I am unclear of the etiology of such a transient lesion.   I asked her to try and photo the lesion next time it occurred and she will try.    Visit for screening mammogram  -     Mammo Screening Bilateral; Future    Graves disease  -     T3 (Triiodothyronine), Free  -     T4, Free  -     Thyroid Stimulating Hormone (TSH)     Gisella Durand   "

## 2021-05-30 VITALS — HEIGHT: 62 IN | WEIGHT: 134.75 LBS | BODY MASS INDEX: 24.8 KG/M2

## 2021-05-30 VITALS — WEIGHT: 131 LBS | HEIGHT: 63 IN | BODY MASS INDEX: 23.21 KG/M2

## 2021-05-31 VITALS — HEIGHT: 63 IN | WEIGHT: 128 LBS | BODY MASS INDEX: 22.68 KG/M2

## 2021-06-01 VITALS — WEIGHT: 135 LBS | BODY MASS INDEX: 24.69 KG/M2

## 2021-06-01 NOTE — TELEPHONE ENCOUNTER
Left message to call back for: Patient  Information to relay to patient:  Call pt.  I have sent in rx for her to be treated.  It sounds like her  could have scabies but hard to know for sure.     DENILSON/IGNACIO

## 2021-06-01 NOTE — TELEPHONE ENCOUNTER
Call pt.  I have sent in rx for her to be treated.  It sounds like her  could have scabies but hard to know for sure.

## 2021-06-01 NOTE — TELEPHONE ENCOUNTER
Patient Returning Call  Reason for call:  Patient called back.  Information relayed to patient:  Informed of message listed below.  Patient states they went to urgent care and have already taken care of it.  Patient has additional questions:  No  If YES, what are your questions/concerns:    Okay to leave a detailed message?: No call back needed

## 2021-06-01 NOTE — TELEPHONE ENCOUNTER
Triage note:    54 year old female called with concerns about scabies exposure.     Her  was exposed to scabies on Sunday (at care facility) that his mother resides. The staff there advised that he contact his provider to be treated. Her  just told her that he has a one inch rashy area near his left underarm and on the left bicep.  He is going to [facility].  She has no symptoms.     Please advise if patient should be treated?  If so, can you send the Rx to pharmacy?    *Ok to leave a detailed message upon call back    Pharmacy: [facility]    Reason for Disposition    [1] EXPOSURE (close contact) to person with known scabies AND [2] has not been treated    Protocols used: SCABIES EXPOSURE-A-    Holley Landin RN, [facility] Refill/Triage, 9/24/2019 6:15 PM

## 2021-06-03 ENCOUNTER — RECORDS - HEALTHEAST (OUTPATIENT)
Dept: ADMINISTRATIVE | Facility: OTHER | Age: 56
End: 2021-06-03

## 2021-06-03 VITALS — WEIGHT: 134.8 LBS | HEIGHT: 63 IN | BODY MASS INDEX: 23.88 KG/M2

## 2021-06-03 NOTE — TELEPHONE ENCOUNTER
RN cannot approve Refill Request    RN can NOT refill this medication med is not covered by policy/route to provider. Last office visit: 5/13/2019 Gisella Durand MD Last Physical: 10/24/2016 Last MTM visit: Visit date not found Last visit same specialty: Visit date not found.  Next visit within 3 mo: Visit date not found  Next physical within 3 mo: Visit date not found      Blaire Paredes, [facility] Triage/Med Refill 11/13/2019    Requested Prescriptions   Pending Prescriptions Disp Refills     permethrin (ELIMITE) 5 % cream 60 g 0     Sig: Apply from neck down to toes and leave on overnight for 8-12 hours then wash off in am.       There is no refill protocol information for this order

## 2021-06-04 VITALS
SYSTOLIC BLOOD PRESSURE: 108 MMHG | WEIGHT: 136.25 LBS | OXYGEN SATURATION: 97 % | BODY MASS INDEX: 24.52 KG/M2 | HEART RATE: 74 BPM | DIASTOLIC BLOOD PRESSURE: 70 MMHG

## 2021-06-04 VITALS
HEIGHT: 63 IN | HEART RATE: 64 BPM | BODY MASS INDEX: 24.27 KG/M2 | DIASTOLIC BLOOD PRESSURE: 60 MMHG | WEIGHT: 137 LBS | SYSTOLIC BLOOD PRESSURE: 112 MMHG

## 2021-06-05 NOTE — PROGRESS NOTES
FEMALE ADULT PREVENTIVE EXAM    CHIEF COMPLAINT:  Female preventive exam.    SUBJECTIVE:  Kasia Macedo is a 54 y.o. female who presents for her routine physical exam.    Patient would like to address the following concerns today:   1) Right shoulder pain and lateral back pain for 4 months: No injury or trauma.  Has pain in right lateral mid back / right shoulder going into elbow.  Pain present all time.  Not into hand/ fingers.  No numbness/tingling.  Feels her range of motion of shoulder reduced.  Has slight weakness in arm.  Worse pain with reaching across body with right arm.  No meds.  Trying to stretch.  Had massage.  2) Ringing in left ear for several months -  Hearing is possibly down a little.  No problems with right ear.  Notices ringing when she is quiet. It does seem to be improving lately.  3) History of thyroid antibodies - Would like to get TFTs checked.  Feels well.  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  Was offered biopsy of kidney but did not want to do it.  Has had normal GFR and negative u/a's for several years.      GYNE HISTORY  Menses: post menopausal  Sexually Active: yes - some vaginal dryness.  Uses lubricant.  Last Pap: 2014 normal  Abnormal Pap: no        She  has no past medical history on file.    Lab Results   Component Value Date    WBC 5.8 06/27/2019    HGB 13.0 06/27/2019    HCT 38.1 06/27/2019    MCV 93 06/27/2019     06/27/2019     06/27/2019    K 4.6 06/27/2019    BUN 10 06/27/2019     Lab Results   Component Value Date    CHOL 291 (H) 05/15/2017    HDL 82 05/15/2017    LDLCALC 193 (H) 05/15/2017    TRIG 82 05/15/2017     Lab Results   Component Value Date    TSH 1.64 05/13/2019     BP Readings from Last 3 Encounters:   02/10/20 112/60   05/13/19 112/70   02/27/18 104/64       Surgeries:  No past surgical history on file.    Family History:  Her family history includes Alcohol abuse in her brother and brother; Dementia in her father and mother;  Glaucoma in her mother; Skin cancer in her father and mother.    Social History:  She  reports that she has never smoked. She has never used smokeless tobacco. She reports current alcohol use. She reports that she does not use drugs.  with adult 2 daughters.  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    Medications:    Current Outpatient Medications:      acidophilus-sporogenes (ACIDOPHILUS EXTRA STRENGTH) 35 million- 25 million cell Tab, Take by mouth., Disp: , Rfl:      MULTIVITAMIN ORAL, Take 1 tablet by mouth daily., Disp: , Rfl:      OMEGA-3S/DHA/EPA/FISH OIL (OMEGA 3 ORAL), Take 1 capsule by mouth daily., Disp: , Rfl:      SELENIUM ORAL, Take 1 tablet by mouth daily., Disp: , Rfl:   HELD MEDICATIONS: None.    Allergies:  No latex allergies.  Allergies   Allergen Reactions     Shellfish Containing Products      Venom-Honey Bee             RISK BEHAVIOR & HEALTH HABITS  Regular Exercise: walking / Qigong.  Balanced diet: yes - trying to eat lots of vegetables  Seat Belt Use: yes  Calcium/Vitamin D: vitamin D3 2000 IU.  Stress management: Qigong    Guns: NO  Dental Care: YES  Mammogram: 2019 normal - dense breasts  Colonoscopy: 2016 normal    REVIEW OF SYSTEMS:  Complete head to toe review of systems is otherwise negative except as above.    OBJECTIVE:  VITAL SIGNS:    Vitals:    02/10/20 0813   BP: 112/60   Pulse: 64     GENERAL:  Patient alert, in no acute distress.  EYES: PERRLA. Extraoccular movements intact, pupils equal, reactive to light and accommodation.  Normal conjunctiva and lids.    ENT:  Hearing grossly normal.  Normal appearance to ears and nose.  Bilateral TM s, external canals, oropharynx normal. Normal lips, gums and teeth.    NECK:  Supple, without thyromegaly or mass.  RESP:  Clear to auscultation without crackles, wheezes or distress.  Normal respiratory effort.   CV:  Regular rate and rhythm without murmurs, rubs or gallops.  Normal pedal pulses.  No  varicosities or edema.  ABDOMEN:  Soft, non-tender, without hepatosplenomegaly, masses, or hernias.   BREASTS:  Nontender, without masses, nipple discharge, erythema, or axillary adenopathy.    :    External genitalia:  Normal without lesions.  Urethra: Normal appearing  Vagina: Normal without discharge  Cervix: Pink.  No CMT.  Uterus:  Nontender, mobile, without masses  Ovaries: Normal without masses  LYMPHATIC: Normal palpation of neck.  No lymphadenopathy.  No bruising.  NEURO:  CN II-XII intact, motor & sensory function all intact.  DTR and reflexes normal.  PSYCHIATRIC:  Alert & oriented with normal mood and affect.  Good judgment and insight.  SKIN:  Normal inspection and palpation.  MUSCULOSKELETAL: Normal gait and station.  - Spine / Ribs / Pelvis: Normal inspection, ROM, stability and strength: Spine, Head, Neck, Upper and Lower Extremities.  Back: Some tenderness over right lateral thoracic spine.  Right shoulder: Mild tenderness biceps insertion.  Full range of motion.  Positive impingement testing. Resisted abduction on right is 5/5.    Kasia was seen today for annual exam.    Diagnoses and all orders for this visit:    Routine general medical examination at a health care facility  -     Tdap vaccine,  6yo or older,  IM  Up to date with mammogram/ colonoscopy.  She will consider Shingrix in future    Vitamin D deficiency  -     Vitamin D, Total (25-Hydroxy)    Encounter for screening for other metabolic disorders  -     Lipid Cascade    Screening for cervical cancer  -     Gynecologic Cytology (PAP Smear)    Graves disease - Recheck TFTs.  -     T3 (Triiodothyronine), Free  -     T4, Free  -     Thyroid Stimulating Hormone (TSH)    Stage 1 chronic kidney disease- If positive for protein refer to nephrology  -     Urinalysis-UC if Indicated    Tinnitus, left - Improving so observe.  If still present in 2 months, refer to audiology.    Chronic right shoulder pain  -     Ambulatory referral to Physical  Therapy    Chronic right-sided thoracic back pain  -     Ambulatory referral to Physical Therapy            Gisella Durand

## 2021-06-06 NOTE — PROGRESS NOTES
SUBJECTIVE: Kasia Macedo is a 54 y.o. female with:  Chief Complaint   Patient presents with     Gynecologic Exam    1) She is here for repeat pap.  Had pap few days ago but unfortunately was not labeled so lab discarded.    2) Abnormal u/a- H/O thin basement membrane disease.  U/A last year was normal.  Recent U/A showed RBCs/ protein. She [facility].    3) Elevated LDL cholesterol - Has had extensive work-up.  NMR lipid panel with favorable LDL size/ no inflammation / negative coronary calcium score.  Wants to work with [employer] with supplements.    OBJECTIVE: /70 (Patient Site: Left Arm, Patient Position: Sitting, Cuff Size: Adult Regular)   Pulse 74   Wt 136 lb 4 oz (61.8 kg)   LMP 06/01/2016   SpO2 97%   BMI 24.52 kg/m   no distress  : Normal female genitalia.  Vagina pink with scant discharge.  Cervix pink.  Pap done.    Lab Results   Component Value Date    CHOL 345 (H) 02/10/2020    CHOL 291 (H) 05/15/2017    CHOL 247 (H) 04/04/2016     Lab Results   Component Value Date    HDL 84 02/10/2020    HDL 82 05/15/2017    HDL 71 04/04/2016     Lab Results   Component Value Date    LDLCALC 241 (H) 02/10/2020    LDLCALC 193 (H) 05/15/2017    LDLCALC 160 (H) 04/04/2016     Lab Results   Component Value Date    TRIG 99 02/10/2020    TRIG 82 05/15/2017    TRIG 78 04/04/2016     No components found for: CHOLHDL     Kasia was seen today for gynecologic exam.    Diagnoses and all orders for this visit:    Hypercholesterolemia  -     Homocysteine  -     Fibrinogen  -     Lipoprotein a (LPPA)    Screening for cervical cancer  -     Gynecologic Cytology (PAP Smear)    Nutritional deficiency  -     Ferritin    Stage 1 chronic kidney disease  -     Comprehensive Metabolic Panel       Gisella Durand

## 2021-06-06 NOTE — TELEPHONE ENCOUNTER
Per Dr. Durand it is ok to scheduled pt at 7:45 am one morning.  Pt is scheduled to come 2/19/20 at 7:45 am for a pap only.

## 2021-06-06 NOTE — PROGRESS NOTES
[facility]   Shoulder Initial Evaluation    Patient Name: Kasia Macedo  Date of evaluation: 3/4/2020  Referral Diagnosis: Chronic right shoulder pain  Chronic right-sided thoracic back pain [M54.6, G89.29]   Referring provider: Gisella Durand* **  Visit Diagnosis:     ICD-10-CM    1. Chronic right shoulder pain M25.511     G89.29    2. Decreased range of motion of right shoulder M25.611    3. Decreased range of motion of neck R29.898    4. Elbow pain, chronic, right M25.521     G89.29      Precautions: Graves disease, chronic kidney disease     Assessment:   Pt is a 54 y.o. year old female with right shoulder pain that started 3-4 months ago.  Patient has difficulty with donning clothing, reaching behind her, laying on right shoulder. Findings are consistent with myofacial tightness and pain in rotator cuff muscles, upper trapezius, wrist flexors/extensors and rhomboids.  Patient appears motivated to participate in Physical Therapy and present with a good Physical Therapy prognosis for resolution of activities limitations.        Pt. is appropriate for skilled PT intervention as outlined in the Plan of Care (POC).  Pt. is a good candidate for skilled PT services to improve pain levels and function.    Goals:  Pt. will demonstrate/verbalize independence in self-management of condition in : 12 weeks  Pt. will be independent with home exercise program in : 12 weeks  Pt. will report decreased intensity, frequency of : in 6 weeks;Pain;Comment  Comment:: in right shoulder     Pt will: return to yoga without pain in right shoulder in 12 weeks.   Pt will: don sweater without pain in right shoulder in 12 weeks.       Patient's goals: help with right shoulder     Patient's expectations/goals are realistic.    Barriers to Learning or Achieving Goals:  No Barriers.       Plan / Patient Instructions:        Plan of Care:   Authorization / Certification Start Date: 03/04/20  Authorization / Certification  End Date: 20  Authorization / Certification Number of Visits: 12  Communication with: Referral Source  Patient Related Instruction: Nature of Condition;Treatment plan and rationale;Self Care instruction;Basis of treatment;Body mechanics;Next steps;Expected outcome  Times per Week: 1  Number of Weeks: 12  Number of Visits: 12  Therapeutic Exercise: ROM;Stretching;Strengthening  Neuromuscular Reeducation: kinesio tape;posture  Manual Therapy: soft tissue mobilization;myofascial release;joint mobilization  Equipment: theraband      POC and pathology of condition were reviewed with patient.  Pt. is in agreement with the Plan of Care  A Home Exercise Program (HEP) was initiated today.  Pt. was instructed in exercises by PT and patient was given a handout with detailed instructions.  Plan for next visit: ask about massage? pec stretch in doorway, rows, shoulder stretching, IR/ext with towel, sleeper stretch, ER with band   STM to upper back, mobs   UT stretch   .   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 female who presents to therapy today with complaints of right shoulder pain. Date of onset/duration of symptoms is 4-5 months ago. Onset was gradual. Symptoms are intermittent and not improving,  pain with certain movements. She denies history of similar symptoms. She describes their previous level of function as not limited    Pain Ratin  Pain rating at best: 0  Pain rating at worst: 8  Pain description: denies numbess and tingling    Achy and sharp pain   Pain in teres, infraspinatus     Functional limitations are described as occurring  with:   lifting, reach into cupboard, dressing, looking up/susanna/turn head,  object    Picking something up, can't reach behind her   (not as much overhead)   Sleeping on right side - pain but if she put her hand on her pillow the pain goes away.     Patient reports benefit from:  nothing       2/10/20: [facility]: Right shoulder pain and lateral back pain for 4 months: No injury or trauma.  Has pain in right lateral mid back / right shoulder going into elbow.  Pain present all time.  Not into hand/ fingers.  No numbness/tingling.  Feels her range of motion of shoulder reduced.  Has slight weakness in arm.  Worse pain with reaching across body with right arm.  No meds.  Trying to stretch.  Had massage.  Objective:      Note: Items left blank indicates the item was not performed or not indicated at the time of the evaluation.    Patient Outcome Measures :  SPADI 36   Shoulder Pain and Disability Index (SPADI) in %: 36     Scores range from 0-100%, where a score of 0% represents minimal pain and maximal function. The minimal clincically important difference is a score reduction of 10%.    Shoulder Examination  1. Chronic right shoulder pain     2. Decreased range of motion of right shoulder     3. Decreased range of motion of neck     4. Elbow pain, chronic, right       Involved side: Right  Posture Observation:      General sitting posture is  fair.  Cervical Clearing: Abnormal see below    Flexion 25% limited    SB: 22 to left, 20 to right     Shoulder/Elbow ROM    Date: 3/4/2020     Shoulder and Elbow ROM ( )   AROM in degrees AROM in degrees AROM in degrees    Right Left Right Left Right Left   Shoulder Flexion (0-180 ) 158 pain  168        Shoulder Abduction (0-180 ) WFL pain at end range  WFL        Shoulder Extension (0-60 )         Shoulder ER (0-90 )         Shoulder IR (0-70 )         Shoulder IR/Ext Sacrum  T6-7        Elbow Flexion (150 )         Elbow Extension (0 )          PROM in degrees PROM  "in degrees PROM in degrees    Right Left Right Left Right Left   Shoulder Flexion (0-180 )         Shoulder Abduction (0-180 )         Shoulder Extension (0-60 )         Shoulder ER (0-90 ) 70-80 pain  90       Shoulder IR (0-70 ) 20-30 \"stuck\"  70       Elbow Flexion (150 )         Elbow Extension (0 )           Shoulder/Elbow Strength   Date: 3/4/2020     Shoulder/Elbow Strength (/5)  Manual Muscle Test (MMT) MMT MMT MMT    Right Left Right Left Right Left   Shoulder Flexion 5 5       Supraspinatus 5 5       Shoulder Abduction 5 5       Shoulder Extension 5 5       Shoulder External Rotation 5 5       Shoulder Internal Rotation 4- pain  5       Elbow Flexion 5 5       Elbow Extension 5 5       Other:         Other:             Palpation: RIGHT: pain in UT with referral into hand, pain in infraspinatus, supraspinatus, UT, posterior neck, rhomboids    Joint Assessment:  Sternoclavicular Joint Assessment: Not Indicated  Acromioclavicular Joint Assessment: Not Indicated  Glenohumeral Joint Assessment:Posterior Capsule tightness.    Shoulder Special Tests    Impingement Cluster Right (+/-) Left (+/-) Rotator Cuff Tests Right (+/-) Left (+/-)   Neer    Lift off      [place]-Marv   Drop Arm Sign -     Painful Arc                  -180/GH  Pain at end range 170  Hornblowers (teres minor) 90/90 scaption with ER resistance     Infraspinatus Test   ERLS (lag sign)      AC Tests Right (+/-) Left (+/-) IRLS (lag sign)      Topton's Active Compression (or labral) 10 adduction pain with thumb down    Labral Tests Right (+/-) Left (+/-)   AC Resisted Extension   (90 flexion and IR)    Biceps Load Test II - resist elbow flex in apprehension position     Crossbody Adduction   Jerk Test     AC step down deformity    [place] Test     GH Instability Tests Right (+/-) Left (+/-)      Sulcus Sign   Biceps Tests Right (+/-) Left (+/-)   Apprehension   Speed - palm up  Mild pain     Relocation   Deysi s - resist " supination/ER     Other:   Other:     Other:   Other:       Exercises:  Exercise #1: Foam roller: upper back massage, infraspinatus massage, thoracic mobs, pec stretch, shoulder flexion stretch   Comment #1: Use of tennis ball against wall for self massage of rhomoboid and infraspinatus   Use heat 20 min in the evening   trialed use of theracane for upper trap release   Discussed regular massage 30 min: 1x a week.     Treatment Today    TREATMENT MINUTES COMMENTS   Evaluation 25 -shoulder pain  -educated on POC, diagnosis, relevant anatomy and basis for treatment.    Self-care/ Home management     Manual therapy 10 STM to infraspinatus, supraspinatus, rhomboid, UT with referral down arm   Glenohumeral mobs inferior and posterior, traction grade III    Neuromuscular Re-education     Therapeutic Activity     Therapeutic Exercises 10  -see exercise flow sheet  Performed HEP with handouts provided.    Gait training     Modality__________________                Total     Blank areas are intentional and mean the treatment did not include these items.     PT Evaluation Code: (Please list factors)  Patient History/Comorbidities: see above   Examination: UE  Clinical Presentation: stable   Clinical Decision Making: low    Patient History/  Comorbidities Examination  (body structures and functions, activity limitations, and/or participation restrictions) Clinical Presentation Clinical Decision Making (Complexity)   No documented Comorbidities or personal factors 1-2 Elements Stable and/or uncomplicated Low   1-2 documented comorbidities or personal factor 3 Elements Evolving clinical presentation with changing characteristics Moderate   3-4 documented comorbidities or personal factors 4 or more Unstable and unpredictable High                Heather Arrington, PT, DPT  3/4/2020  6:51 AM

## 2021-06-07 ENCOUNTER — ANCILLARY PROCEDURE (OUTPATIENT)
Dept: MAMMOGRAPHY | Facility: CLINIC | Age: 56
End: 2021-06-07
Attending: STUDENT IN AN ORGANIZED HEALTH CARE EDUCATION/TRAINING PROGRAM
Payer: COMMERCIAL

## 2021-06-07 DIAGNOSIS — Z12.31 VISIT FOR SCREENING MAMMOGRAM: ICD-10-CM

## 2021-06-07 PROBLEM — E78.00 HYPERCHOLESTEROLEMIA: Status: ACTIVE | Noted: 2017-05-01

## 2021-06-07 PROCEDURE — 77067 SCR MAMMO BI INCL CAD: CPT | Performed by: RADIOLOGY

## 2021-06-07 PROCEDURE — 77063 BREAST TOMOSYNTHESIS BI: CPT | Performed by: RADIOLOGY

## 2021-06-08 ENCOUNTER — OFFICE VISIT (OUTPATIENT)
Dept: FAMILY MEDICINE | Facility: CLINIC | Age: 56
End: 2021-06-08
Payer: COMMERCIAL

## 2021-06-08 VITALS
TEMPERATURE: 97.3 F | SYSTOLIC BLOOD PRESSURE: 101 MMHG | WEIGHT: 130 LBS | OXYGEN SATURATION: 97 % | BODY MASS INDEX: 23.78 KG/M2 | HEART RATE: 75 BPM | DIASTOLIC BLOOD PRESSURE: 70 MMHG

## 2021-06-08 DIAGNOSIS — M25.562 CHRONIC PAIN OF LEFT KNEE: Primary | ICD-10-CM

## 2021-06-08 DIAGNOSIS — G89.29 CHRONIC PAIN OF LEFT KNEE: Primary | ICD-10-CM

## 2021-06-08 DIAGNOSIS — M25.532 LEFT WRIST PAIN: ICD-10-CM

## 2021-06-08 PROCEDURE — 99213 OFFICE O/P EST LOW 20 MIN: CPT | Performed by: STUDENT IN AN ORGANIZED HEALTH CARE EDUCATION/TRAINING PROGRAM

## 2021-06-09 NOTE — PROGRESS NOTES
"SUBJECTIVE: Kasia Macedo is a 51 y.o. female with:  Chief Complaint   Patient presents with     Spasms     left eye x 6 weeks ago, on and off     Eye Drainage     discharge and guncky for a while x for couple of days only    1) She feels like she is gaining weight.  She does not have constipation but has dry skin.  She is working with naturopath for Grave's disease and using diet / supplements.      2) About 6 weeks ago she had some spasms in the upper eyelid.  Seemed to occur more in the am.  It was hard to open eye in am for a couple of mornings due to the muscle spasms.  They are better now but she is still having them.  She was having some white discharge from the eye.  Her vision is normal.  No headaches.  Her left upper trapezius muscle is a little tight. 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  She was taking magnesium as a supplement but has been off it.    Patient Active Problem List   Diagnosis     Chronic Kidney Disease (NKF Classification)-Possible Thin basement membrane     Finger Sprain     Hyperthyroidism     Graves disease     Family history of heart disease     Spasm eyelid      OBJECTIVE:   Visit Vitals     /72 (Patient Site: Left Arm, Patient Position: Sitting, Cuff Size: Adult Regular)     Pulse 60     Temp 97.5  F (36.4  C) (Oral)     Resp 12     Ht 5' 2.21\" (1.58 m)     Wt 134 lb 12 oz (61.1 kg)     LMP 06/01/2016     Breastfeeding No     BMI 24.48 kg/m2    no distress  Eyes: PERRL.  Conjunctiva clear.  No obvious muscle spasms.  Neuro: AAOx3.  Normal strength and tone.  Normal gait.  No tremors in upper extremities.    Kasia was seen today for spasms and eye drainage.    Diagnoses and all orders for this visit:    Spasm eyelid -  She is reassured no serious condition.  I recommend she start back on her magnesium.  --     Magnesium, Red Blood Cell; Future    Graves disease - Will recheck TFTs to make sure she is not hypothyroid now.    -     T3 (Triiodothyronine), " Free; Future  -     T4, Free; Future  -     Thyroid Stimulating Hormone (TSH); Future     Gisella Durand

## 2021-06-10 DIAGNOSIS — M25.532 LEFT WRIST PAIN: ICD-10-CM

## 2021-06-10 PROCEDURE — 73110 X-RAY EXAM OF WRIST: CPT | Mod: LT | Performed by: RADIOLOGY

## 2021-06-10 NOTE — PROGRESS NOTES
Kasia Macedo is a 55 y.o. female who is being seen via a billable video visit.  Patient has given verbal consent for video visit? Yes  Video Start Time: 7:16am  Telehealth Visit Details:  Type of service: Telehealth  Video End Time (time video stopped): 7:48  Originating Location (Patient): Home  Additional Participants in Telehealth Visit: no  Distant Location (Provider Location): [facility]  Mode of Communication: Audio Visual   Heather Arrington, PT  8/21/2020    [facility] Daily Progress     Patient Name: Kasia Macedo  Date: 8/21/2020  Visit #: 2/12  PTA visit #:    Referral Diagnosis: Chronic right shoulder pain  Chronic right-sided thoracic back pain [M54.6, G89.29]   Referring provider: Gisella Durand  Visit Diagnosis:     ICD-10-CM    1. Chronic right shoulder pain  M25.511     G89.29    2. Decreased range of motion of right shoulder  M25.611    3. Decreased range of motion of neck  R29.898    4. Elbow pain, chronic, right  M25.521     G89.29    5. Chronic low back pain with left-sided sciatica  M54.42 Ambulatory referral to PT/OT    G89.29          Assessment:   From Evaluation: Pt is a 54 y.o. year old female with right shoulder pain that started 3-4 months ago.  Patient has difficulty with donning clothing, reaching behind her, laying on right shoulder. Findings are consistent with myofacial tightness and pain in rotator cuff muscles, upper trapezius, wrist flexors/extensors and rhomboids.  Patient appears motivated to participate in Physical Therapy and present with a good Physical Therapy prognosis for resolution of activities limitations. Pt reports today that she has chronic left-sided low back pain with radicular symptoms into left glut, quad, lower lateral leg and plantar aspect of foot.  Pt denies numbness/tingling or weakness.  Pain is worse with sitting and can be irritated with standing, improved with walking.  Low back pain is  constant but radicular symptoms come and go.   Patient demonstrates understanding/independence with home program.  Patient is benefitting from skilled physical therapy and is making steady progress toward functional goals.  Patient is appropriate to continue with skilled physical therapy intervention, as indicated by initial plan of care.  Pt has not been seen since March due to COVID.  She continues to have significant shoulder pain.  Shoulder limited range of motion with hand behind her back.  Gave pt a few ex's to perform daily and recommended she call for another appointment in 1 week.     Goals:  Pt. will demonstrate/verbalize independence in self-management of condition in : 12 weeks  Pt. will be independent with home exercise program in : 12 weeks  Pt. will report decreased intensity, frequency of : in 6 weeks;Pain;Comment  Comment:: in right shoulder     Pt will: return to yoga without pain in right shoulder in 12 weeks.   Pt will: don sweater without pain in right shoulder in 12 weeks.   Plan / Patient Education:     Continue with initial plan of care.  Progress with home program as tolerated.     Plan for next session: UT stretch, check for low back order, ask about lumbar ext ex's, re check IR/ext of left shoulder     Subjective:     Pain Rating: pain in left shoulder is improving   Able to sleep on left side for short amount of times. Ex's are helping.   Chronic left-sided low back pain with radicular symptoms - would like to address     Objective:   Lumbar assessment: order for low back requested today   Negative Slump Test   Inconclusive SLR - probable HS tightness on left   No change with repeated standing lumbar extension   Unable to perform press-ups due to shoulder pain   Will trial lumbar extension ex's as pt has less radicular symptoms with ambulating vs sitting     Exercises:  Exercise #1: pec stretch in doorway hold 20-30 seconds - not today  Comment #1: IR/ext with wand in standing x10 -  discussed  Exercise #2: sleeper stretch hold 20-30 seconds - not today  Comment #2: Able to perform IR/ext with towel in standing with less pain today - added to HEP  Exercise #3: ER: trialed with band - increased ease in S/L 15-20 reps - 1 set - 1x a day with 1#  Comment #3: scapular retractions hold up to 5 secs - rows with YTB 10-15 reps - 1 set - 1x a day  Exercise #4: Lumbar extension: standing x10, FALGUNI hold 1-2 min - every 1-2 hours         Shoulder/Elbow ROM             Date: 3/4/2020  8/12/20     Shoulder and Elbow ROM ( )    AROM in degrees AROM in degrees AROM in degrees    Right Left Right Left Right Left   Shoulder Flexion (0-180 ) 158 pain  168   similar to left          Shoulder Abduction (0-180 ) WFL pain at end range  WFL   similar to left         Shoulder Extension (0-60 )               Shoulder ER (0-90 )               Shoulder IR (0-70 )               Shoulder IR/Ext Sacrum  T6-7   sacrum          Elbow Flexion (150 )                 Treatment Today     TREATMENT MINUTES COMMENTS   Evaluation     Self-care/ Home management     Manual therapy     Neuromuscular Re-education     Therapeutic Activity     Therapeutic Exercises 32 See ex's flow sheet  - assessment above    Gait training     Modality__________________                Total 32    Blank areas are intentional and mean the treatment did not include these items.       Heather Arrington, PT, DPT   8/21/2020

## 2021-06-10 NOTE — PROGRESS NOTES
Kasia Macedo is a 55 y.o. female who is being seen via a billable video visit.  Patient has given verbal consent for video visit? Yes  Video Start Time: 8:45  Telehealth Visit Details:  Type of service: Telehealth  Video End Time (time video stopped): 9:00  Originating Location (Patient): Home  Additional Participants in Telehealth Visit: no  Distant Location (Provider Location): [facility]  Mode of Communication: Audio Visual   Short visit due to difficulty connecting.     Heather Arrington, PT  8/12/2020    [facility] Daily Progress     Patient Name: Kasia Macedo  Date: 8/12/2020  Visit #: 2/12  PTA visit #:    Referral Diagnosis: Chronic right shoulder pain  Chronic right-sided thoracic back pain [M54.6, G89.29]   Referring provider: Gisella Durand  Visit Diagnosis:     ICD-10-CM    1. Chronic right shoulder pain  M25.511     G89.29    2. Decreased range of motion of right shoulder  M25.611    3. Decreased range of motion of neck  R29.898    4. Elbow pain, chronic, right  M25.521     G89.29          Assessment:   From Evaluation: Pt is a 54 y.o. year old female with right shoulder pain that started 3-4 months ago.  Patient has difficulty with donning clothing, reaching behind her, laying on right shoulder. Findings are consistent with myofacial tightness and pain in rotator cuff muscles, upper trapezius, wrist flexors/extensors and rhomboids.  Patient appears motivated to participate in Physical Therapy and present with a good Physical Therapy prognosis for resolution of activities limitations.   Patient demonstrates understanding/independence with home program.  Patient is benefitting from skilled physical therapy and is making steady progress toward functional goals.  Patient is appropriate to continue with skilled physical therapy intervention, as indicated by initial plan of care.  Pt has not been seen since March due to COVID.  She continues to  have significant shoulder pain.  Shoulder limited range of motion with hand behind her back.  Gave pt a few ex's to perform daily and recommended she call for another appointment in 1 week.     Goals:  Pt. will demonstrate/verbalize independence in self-management of condition in : 12 weeks  Pt. will be independent with home exercise program in : 12 weeks  Pt. will report decreased intensity, frequency of : in 6 weeks;Pain;Comment  Comment:: in right shoulder     Pt will: return to yoga without pain in right shoulder in 12 weeks.   Pt will: don sweater without pain in right shoulder in 12 weeks.   Plan / Patient Education:     Continue with initial plan of care.  Progress with home program as tolerated.     Plan for next session:  Rows (does she have bands),  Scapular retractions, shoulder stretching, IR/ext with towel - progress to,  ER with band or weights   STM to upper back, mobs   UT stretch     Subjective:     Pain Rating: continues to have significant pain   Unable to practice yoga, pain with sleeping, pain with dressing    Objective:     Shoulder/Elbow ROM             Date: 3/4/2020  8/12/20     Shoulder and Elbow ROM ( )    AROM in degrees AROM in degrees AROM in degrees    Right Left Right Left Right Left   Shoulder Flexion (0-180 ) 158 pain  168   similar to left          Shoulder Abduction (0-180 ) WFL pain at end range  WFL   similar to right         Shoulder Extension (0-60 )               Shoulder ER (0-90 )               Shoulder IR (0-70 )               Shoulder IR/Ext Sacrum  T6-7   sacrum          Elbow Flexion (150 )             Exercises:  Exercise #1: pec stretch in doorway hold 20-30 seconds  Comment #1: IR/ext with wand in standing x10  Exercise #2: sleeper stretch hold 20-30 seconds      Treatment Today     TREATMENT MINUTES COMMENTS   Evaluation     Self-care/ Home management     Manual therapy     Neuromuscular Re-education     Therapeutic Activity     Therapeutic Exercises 15 See ex's  flow sheet    Gait training     Modality__________________                Total 15    Blank areas are intentional and mean the treatment did not include these items.       Heather Arrington, PT, DPT   8/12/2020

## 2021-06-10 NOTE — PROGRESS NOTES
"SUBJECTIVE: Kasia Macedo is a 52 y.o. female with:  Chief Complaint   Patient presents with     Dizziness     x yesterday am, spinning     Nausea    She has a spinning sensation starting in the am yesterday morning after she got up.  It also happened a couple of other times during the day.  She has had nausea but no vomiting.  Seems related to movement and worse on right side.  No hearing issues or URI.  No trouble with coordination or loss of strength in arms/ legs or numbness/tingling in extremities.  Episodes last for seconds.  Today she feels a little better - only one episode.    She has elevated cholesterol and is working with a naturopath with some supplements.  She would like to repeat her cholesterol panel.    OBJECTIVE: /60 (Patient Site: Left Arm, Patient Position: Sitting, Cuff Size: Adult Regular)  Pulse 60  Resp 8  Ht 5' 2.6\" (1.59 m)  Wt 131 lb (59.4 kg)  LMP 06/01/2016  Breastfeeding? No  BMI 23.5 kg/m2   No acute distress.  HEENT: Head is atraumatic and/normocephalic.  PERRL.  Conjunctiva clear.  Tympanic membranes grey with normal landmarks and normal light reflexes.  No nasal discharge.  Oropharynx is pink and moist.  Sinuses nontender.  Neck: Supple.  No lymphadenopathy or thyromegaly.  Lungs: Clear to auscultation.  No wheezing, retractions, or tachypnea.  Heart: RRR. S1 and S2 normal.  No murmurs, rubs, or gallops.  Neuro: Awake, alert, oriented x 3.  Normal strength and tone.  Normal gait.   Hallpike maneuver: she has vertigo with head tilted to right.    Kasia was seen today for dizziness and nausea.    Diagnoses and all orders for this visit:    Benign positional vertigo- I did try repositioning maneuver.  If she is not improved, consider vestibular rehab at [facility].    Hypercholesterolemia- return for fasting lipids.    Gisella Durand        "

## 2021-06-10 NOTE — RESULT ENCOUNTER NOTE
Juan Carlos Pedroza,     No fractures seen. Let's stick with the plan I sent you earlier to wear brace, gentle range of motion but otherwise rest and nothing to aggravate plan. If still having pain after a couple of weeks, we will have you see sports medicine.    Blaire Paredes MD  [facility]  [phone]

## 2021-06-11 NOTE — PROGRESS NOTES
Kasia Macedo is a 55 y.o. female who is being seen via a billable video visit.  Patient has given verbal consent for video visit? Yes  Video Start Time: 7:59  Telehealth Visit Details:  Type of service: Telehealth  Video End Time (time video stopped): 8:25  Originating Location (Patient): Home  Additional Participants in Telehealth Visit: no  Distant Location (Provider Location): [facility]  Mode of Communication: Audio Visual   Heather Arrington, PT  9/9/2020    [facility] Daily Progress     Patient Name: Kasia Macedo  Date: 9/9/2020  Visit #: 2/12  PTA visit #:    Referral Diagnosis: Chronic right shoulder pain  Chronic right-sided thoracic back pain [M54.6, G89.29]   Referring provider: Gisella Durand  Visit Diagnosis:     ICD-10-CM    1. Chronic right shoulder pain  M25.511     G89.29    2. Decreased range of motion of right shoulder  M25.611    3. Decreased range of motion of neck  R29.898    4. Elbow pain, chronic, right  M25.521     G89.29    5. Chronic left-sided low back pain with left-sided sciatica  M54.42     G89.29          Assessment:   From Evaluation: Pt is a 54 y.o. year old female with right shoulder pain that started 3-4 months ago.  Patient has difficulty with donning clothing, reaching behind her, laying on right shoulder. Findings are consistent with myofacial tightness and pain in rotator cuff muscles, upper trapezius, wrist flexors/extensors and rhomboids.  Patient appears motivated to participate in Physical Therapy and present with a good Physical Therapy prognosis for resolution of activities limitations. Pt reports today that she has chronic left-sided low back pain with radicular symptoms into left glut, quad, lower lateral leg and plantar aspect of foot.  Pt denies numbness/tingling or weakness.  Pain is worse with sitting and can be irritated with standing, improved with walking.  Low back pain is constant but radicular  symptoms come and go.   Patient demonstrates understanding/independence with home program.  Patient is benefitting from skilled physical therapy and is making steady progress toward functional goals.  Patient is appropriate to continue with skilled physical therapy intervention, as indicated by initial plan of care.  Low back pain has resolved but currently pt is having left hip pain with occasional symptoms into thigh, lower lateral leg and foot.   Right shoulder continues to be bothersome but increased ease with putting on bra, reaching into cupboard but continues to have trouble sleeping.  Recommended pt come in person if possible to address shoulder dysfunction with manual therapy.     Goals:  Pt. will demonstrate/verbalize independence in self-management of condition in : 12 weeks  Pt. will be independent with home exercise program in : 12 weeks  Pt. will report decreased intensity, frequency of : in 6 weeks;Pain;Comment  Comment:: in right shoulder     Pt will: return to yoga without pain in right shoulder in 12 weeks.   Pt will: don sweater without pain in right shoulder in 12 weeks. Progressing   Plan / Patient Education:     Continue with initial plan of care.  Progress with home program as tolerated.     Plan for next session: R shoulder and L hip.  Encourage pt to come in person for manual therapy. Ask about hip ex's.     Subjective:   Right shoulder is a little bit better, but a little bit sore. Has not been getting in as much ex's.  Under too much stress in the rest of her life.  Would like to discontinue therapy at this time.  Will call within 30 days to reschedule if needed.  Sleeping on left side continues to be hard. Low back pain has moved into left hip. No pain in low back - pain with laying on left side.   Pain into thigh and lateral lower leg and to bottom of foot.  Pain in LE comes and goes.  If she touches it's tender - pt can run and bike.  Notices foot and hip the most.      Objective:    Exercises: New ex's given today for left hip pain and limited neck ROM   Exercise #1: UT stretch/levator stretch hold 20-30 seconds  Comment #1: Piriformis stretch: figure -4 in supine and sitting, modified piriformis in supine hold 20-30 seconds  Exercise #2: Quad stretch with leg off the table hold ankle - hold 20-30 seconds  Comment #2: Able to perform IR/ext with towel in standing with less pain today - added to HEP  Exercise #3: ER: trialed with band - increased ease in S/L 15-20 reps - 1 set - 1x a day with 1#  Comment #3: scapular retractions hold up to 5 secs - rows with YTB 10-15 reps - 1 set - 1x a day  Exercise #4: Lumbar extension: standing x10, FALGUNI hold 1-2 min - every 1-2 hours      Exercises: Discussed and reviewed all ex's today   Exercise #1: UT stretch/levator stretch hold 20-30 seconds  Comment #1: Piriformis stretch: figure -4 in supine and sitting, modified piriformis in supine hold 20-30 seconds  Exercise #2: Quad stretch with leg off the table hold ankle - hold 20-30 seconds  Comment #2: Able to perform IR/ext with towel in standing with less pain today - added to HEP  Exercise #3: ER: trialed with band - increased ease in S/L 15-20 reps - 1 set - 1x a day with 1#  Comment #3: scapular retractions hold up to 5 secs - rows with YTB 10-15 reps - 1 set - 1x a day  Exercise #4: Lumbar extension: standing x10, FALGUNI hold 1-2 min - every 1-2 hours         Shoulder/Elbow ROM             Date: 3/4/2020  8/12/20 9/9/20    Shoulder and Elbow ROM ( )    AROM in degrees AROM in degrees AROM in degrees    Right Left Right Left Right Left   Shoulder Flexion (0-180 ) 158 pain  168   similar to left    Similar to left - pain       Shoulder Abduction (0-180 ) WFL pain at end range  WFL   similar to left    Similar to left - pain at end range      Shoulder Extension (0-60 )               Shoulder ER (0-90 )               Shoulder IR (0-70 )               Shoulder IR/Ext Sacrum  T6-7   sacrum     L4-5      Elbow  Flexion (150 )                 Treatment Today     TREATMENT MINUTES COMMENTS   Evaluation     Self-care/ Home management     Manual therapy     Neuromuscular Re-education     Therapeutic Activity     Therapeutic Exercises 26 See ex's flow sheet  - assessment above    Gait training     Modality__________________                Total 26    Blank areas are intentional and mean the treatment did not include these items.       Heather Arrington, PT, DPT   9/9/2020     [facility] Discharge Summary  Patient Name: Kasia Macedo  Date: 10/8/2020  Referral Diagnosis:  Chronic right shoulder pain  Chronic right-sided thoracic back pain [M54.6, G89.29]     Referring provider: Gisella Durand  Visit Diagnosis:   1. Chronic right shoulder pain     2. Decreased range of motion of right shoulder     3. Decreased range of motion of neck     4. Elbow pain, chronic, right     5. Chronic left-sided low back pain with left-sided sciatica         Goals:Goals:  Pt. will demonstrate/verbalize independence in self-management of condition in : 12 weeks  Pt. will be independent with home exercise program in : 12 weeks  Pt. will report decreased intensity, frequency of : in 6 weeks;Pain;Comment  Comment:: in right shoulder     Pt will: return to yoga without pain in right shoulder in 12 weeks.   Pt will: don sweater without pain in right shoulder in 12 weeks. Progressing     Patient was seen for 4 visits from 3/4/20 to 9/9/20 with 0 missed appointments.  Encouraged pt to come into therapy in-person to continue to address dysfunction with manual therapy. Pt did not call to schedule an appointment.     Therapy will be discontinued at this time.  The patient will need a new referral to resume.    Thank you for your referral.  Heather Arrington, PT  10/8/2020  12:23 PM

## 2021-06-12 NOTE — PROGRESS NOTES
[facility]   Initial Evaluation    Patient Name: Kasia Macedo  Date of evaluation: 8/29/2017  PRECAUTIONS: none  Referral Diagnosis: Right Arm Pain  Referring provider: Gisella Durand  Visit Diagnosis:     ICD-10-CM    1. Decreased ROM of right shoulder M25.611    2. Right rotator cuff tendonitis M75.81    3. Bicipital tendonitis of right shoulder M75.21    4. Elbow pain, chronic, right M25.521     G89.29    5. Chronic right shoulder pain M25.511     G89.29    6. Right lateral epicondylitis M77.11    7. Muscle weakness of right upper extremity M62.81        Assessment:      Pt. is appropriate for skilled PT intervention as outlined in the Plan of Care (POC).  Pt. is a good candidate for skilled PT services to improve pain levels and function.  Signs/sx consistent with right lateral epicondylitis with R bicipital and rotator cuff tendonitis. HEP initiated today to include CFM per below. Pt with good tolerance for all ex. Pt should benefit well from skilled PT for successful return to PLOF, which she describes as not limtied.    Goals:  Pt. 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    Barriers to Learning or Achieving Goals:  No Barriers.       Plan / Patient Instructions:        Plan of Care:   Communication with: Referral Source  Patient Related Instruction: Nature of  "Condition;Treatment plan and rationale;Self Care instruction;Basis of treatment;Body mechanics;Posture;Precautions;Next steps;Expected outcome  Times per Week: 1-2  Number of Weeks: 4-6  Number of Visits: up to 12  Precautions / Restrictions : none  Therapeutic Exercise: ROM;Stretching;Strengthening  Neuromuscular Reeducation: kinesio tape;posture  Manual Therapy: soft tissue mobilization;joint mobilization;myofascial release  Modalities: ultrasound;cold pack (prn)    Plan for next visit: UBE > Review HEP and CFM, progressing shoulder posterior capsule stretch and ER strengthening as tolerated.      Subjective:       History of Present Illness:    Kasia is a 52 y.o. female who presents to therapy today with complaints of chronic right-sided UE pain.   Denies numbness or tingling in the hand. Denies neck pain or HA.  Onset: About a year ago. Insidious and gradual onset. Seems to come and go.  Duration: Intermittent  Had tried a tumeric supplement, which did help quite a bit, but her naturopath did not want her to be on it for an extended period of time.  Worse with yoga (WB through R UE), typing, writing, gripping, playing with kids for work (catching a frisbee for example), lifting, carrying  Better with tumeric  Pain Medication: Denies pain medication use.  Sleep: Denies waking due to pain.  Denies previous R UE surgeries. No pacemaker or defibrillator.    Pt seeks PT to \"strengthen my right arm to reduce pain.\"    Pain Ratin  Pain rating at best: 3  Pain rating at worst: 9  Pain description: aching, dull, pain, soreness and weakness     Objective:      Patient Outcome Measures :    Shoulder Pain and Disability Index (SPADI) in %: 50   Scores range from 0-100%, where a score of 0% represents minimal pain and maximal function. The minimal clincically important difference is a score reduction of 10%.  QuickDASH Score: 41   Scores range from 0-100, where a score of 0 represents minimal pain and maximal function. " The minimal clinically important difference is a negative chagne of 16 points.     Examination  1. Decreased ROM of right shoulder     2. Right rotator cuff tendonitis     3. Bicipital tendonitis of right shoulder     4. Elbow pain, chronic, right     5. Chronic right shoulder pain     6. Right lateral epicondylitis     7. Muscle weakness of right upper extremity       Precautions/Restrictions: None  Involved side: Right  Posture Observation:      General sitting posture is  normal.  General standing posture is normal.    Cervical clearing WNL and NP t/o.  Shoulder/Elbow ROM  Date:      Shoulder and Elbow ROM ( )   AROM in degrees AROM in degrees AROM in degrees    Right Left Right Left Right Left   Shoulder Flexion (0-180 ) WNL WNL       Shoulder Abduction (0-180 ) WNL WNL       Shoulder Extension (0-60 )         Shoulder ER (0-90 ) WNL WNL       Shoulder IR (0-70 )         Shoulder IR/EXT THUMB TO T12 AND PF WNL       Elbow Flexion (150 ) WNL WNL       Elbow Extension (0 ) WNL WNL        PROM in degrees PROM in degrees PROM in degrees    Right Left Right Left Right Left   Shoulder Flexion (0-180 )         Shoulder Abduction (0-180 )         Shoulder Extension (0-60 )         Shoulder ER (0-90 )         Shoulder IR (0-70 )         Elbow Flexion (150 )         Elbow Extension (0 )           Shoulder/Elbow Strength. Denies pain with all motions  Date:      Shoulder/Elbow Strength (/5)  Manual Muscle Test (MMT) MMT MMT MMT    Right Left Right Left Right Left   Shoulder Flexion 5 5       Supraspinatus         Shoulder Abduction 5 5       Shoulder Extension         Shoulder External Rotation 5 5       Shoulder Internal Rotation 5 5       Elbow Flexion 5 5       Elbow Extension 5 5       Other: Wrist flexion 5        Other: Wrist extension 5            (+) pain with resisted R 3rd digit extension.  TTP with palpable scar tissue present R lateral epicondyle and proximal wrist extensor tendons.  TTP R long head of the  biceps tendon and subscapularis tendons.   Strength: R 45-40-45 lb, L 45-35-35 lb.      Treatment Today     TREATMENT MINUTES COMMENTS   Evaluation 20 R shoulder/elbow   Self-care/ Home management     Manual therapy 6 CFM to R wrist extensor tendons and R long head of the biceps tendon and subscapularis tendons with instruction on performance for home. Handout provided.   Neuromuscular Re-education     Therapeutic Activity     Therapeutic Exercises 19 -Discussed diagnosis, prognosis, POC, relevant anatomy and basis for tx.  -Reviewed and performed HEP with handouts provided.  -Encouraged use of ice to same areas of CFM per above x15-20 min intervals at least once/day to decrease inflammation.   Gait training     Modality__________________                Total 45    Blank areas are intentional and mean the treatment did not include these items.            PT Evaluation Code: (Please list factors)  Patient History/Comorbidities: none  Examination: R shoulder and elbow  Clinical Presentation: Stable  Clinical Decision Making: Low    Patient History/  Comorbidities Examination  (body structures and functions, activity limitations, and/or participation restrictions) Clinical Presentation Clinical Decision Making (Complexity)   No documented Comorbidities or personal factors 1-2 Elements Stable and/or uncomplicated Low   1-2 documented comorbidities or personal factor 3 Elements Evolving clinical presentation with changing characteristics Moderate   3-4 documented comorbidities or personal factors 4 or more Unstable and unpredictable High           Alex Vinson  8/29/2017  8:24 AM

## 2021-06-12 NOTE — PROGRESS NOTES
"[facility] Daily Progress     Patient Name: Kasia Macedo  Date: 2017  Visit #: 2  PTA visit #:    PRECAUTIONS: none  Referral Diagnosis: Right Arm Pain  Referring provider: Gisella Durand  Visit Diagnosis:     ICD-10-CM    1. Decreased ROM of right shoulder M25.611    2. Right rotator cuff tendonitis M75.81    3. Bicipital tendonitis of right shoulder M75.21    4. Elbow pain, chronic, right M25.521     G89.29    5. Chronic right shoulder pain M25.511     G89.29    6. Right lateral epicondylitis M77.11    7. Muscle weakness of right upper extremity M62.81          Assessment:     HEP/POC compliance is  good .  Patient demonstrates understanding/independence with home program.  Patient is appropriate to continue with skilled physical therapy intervention, as indicated by initial plan of care.   Pt with good tolerance for all ex without increased pain; fatigue only with strengthening exercises.    Goal Status:  Pt. 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  Progress wrist extensor eccentrics and TB ER to OTB as tolerated.  Eventually progress to PBall T-I-W exercises.    Subjective:     Pain Ratin  So far things are going good.\" Doing the exercises and CFM regularly. Denies pain or difficulty with ex. Using 5 oz can for wrist extensor eccentrics and that is pretty " easy; I don't really get fatigued. The band exercise for the shoulder definitely feels like I am working something.    Objective:     Reports continued TTP over R wrist extensor tendons and R anterior shoulder along bicipital tendon.  Increased wrist extensor eccentrics to 1#, modified posterior shoulder stretch, and progressed RC/scap stabilizer strength by adding TB lateral walks to HEP.  Reviewed and encouraged use of ice to the R elbow and anterior shoulder.    Treatment Today     TREATMENT MINUTES COMMENTS   Evaluation     Self-care/ Home management     Manual therapy 4 Reviewed and patient performed self-CFM to R wrist extensor tendons and R long head of the biceps tendon and subscapularis tendons    Neuromuscular Re-education     Therapeutic Activity     Therapeutic Exercises 23 Ex per flow sheet   Gait training     Modality__________________                Total 27    Blank areas are intentional and mean the treatment did not include these items.       Alex Vinson  9/5/2017

## 2021-06-12 NOTE — PROGRESS NOTES
"SUBJECTIVE: Kasia Macedo is a 52 y.o. female with:  Chief Complaint   Patient presents with     Hypothyroidism     Arm Pain     rt     She was diagnosed with Grave's disease and was taking Tapazole prescribed by endocrinology a couple of years ago.  She started working with naturopath with diet/ supplements.  She was able to stop the Tapazole and her thyroid function has been stable in the last couple of years.  Lately she has had return of several symptoms she had previously.  She has had a lot of stress dealing with her in-laws with dementia who were living at her house temporarily.  It was very stressful as she also has a teenage daughter and her father-in-law was very disagreeable.  Her in-laws have moved back to their home so the stress is better.    She feels that her vocal cords are strained.  Going on for 3 weeks.  No problems with swallowing.  No lumps in her neck.  She has had some weight loss over 6 weeks.  She also has felt flushed.  She is having more frequent bowel movements and some palpitations at night.  She is starting to feel a little better now. Symptoms are waning.    She has been having pain in the right arm.  Pain is located in upper arm and over forearm.  No shoulder pain. No injury or trauma.  She took a supplement that helped for a short time.  She has been unable to do yoga.   No numbness / tingling.  Arm feels a little weak.    SH: .  Works as therapist.    OBJECTIVE: BP 92/50  Pulse 63  Resp 15  Ht 5' 2.5\" (1.588 m)  Wt 128 lb (58.1 kg)  LMP 06/01/2016  SpO2 98%  BMI 23.04 kg/m2   Neck: No thyromegaly or lymphadenopathy.  Lungs: Clear to auscultation.  No retractions or tachypnea.  CV: RRR. S1 and S2 normal.  No murmurs, rubs or gallops.  Shoulders: Full range of motion   upper extremities: Tender over biceps tendon insertion and lateral forearm.  Muscle strength 5/5 equal bilaterally.    Recent Results (from the past 240 hour(s))   T3 (Triiodothyronine), Free   Result " Value Ref Range    T3, Free 2.6 1.9 - 3.9 pg/mL   T4, Free   Result Value Ref Range    Free T4 1.1 0.7 - 1.8 ng/dL   Thyroid Stimulating Hormone (TSH)   Result Value Ref Range    TSH 1.44 0.30 - 5.00 uIU/mL   Thyroid Peroxidase Antibody   Result Value Ref Range    Thyroid Peroxidase Ab 194.0 (H) 0.0 - 5.6 IU/mL      Kasia was seen today for hypothyroidism and arm pain.    Diagnoses and all orders for this visit:    Right arm pain- I think this is a strain/sprain  -     Ambulatory referral to PT/OT    Graves disease- She is reassured her thyroid labs are normal.    Hoarse voice quality- Observe for now.    Patient Instructions   Do some salt water gargles and if voice changes persist, will refer to ENT doctor to take a look at your vocal cords.    Will patient schedule appointment? Yes  [facility]: [place]  Phone: [phone]      Gisella Durand

## 2021-06-13 NOTE — PROGRESS NOTES
[facility] Daily Progress     Patient Name: Kasia Macedo  Date: 2017  Visit #: 3  PTA visit #:    PRECAUTIONS: none  Referral Diagnosis: Right Arm Pain  Referring provider: Gisella Durand  Visit Diagnosis:     ICD-10-CM    1. Decreased ROM of right shoulder M25.611    2. Right rotator cuff tendonitis M75.81    3. Bicipital tendonitis of right shoulder M75.21    4. Elbow pain, chronic, right M25.521     G89.29    5. Chronic right shoulder pain M25.511     G89.29    6. Right lateral epicondylitis M77.11    7. Muscle weakness of right upper extremity M62.81          Assessment:     HEP/POC compliance is  good .  Patient demonstrates understanding/independence with home program.  Patient is appropriate to continue with skilled physical therapy intervention, as indicated by initial plan of care.   Pt with good tolerance for all ex without increased pain; fatigue only with strengthening exercises.    Goal Status:  Pt. 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  Review newly added ex.    Subjective:     Pain Ratin  Still with pain in the arm. Tried yoga once, but the plank and down dog positioning still hurt the R arm, so I have not done it since then.  Tried using 1# weight for wrist extensor eccentrics, but it might be a  "little too much because it was kind of painful.  Questions on \"what we are doing.\"    Objective:     Updated ex per flow sheet.  Mild B scapular winging noted, likely SA weakness, contributing to continued pain and difficulty with plank and downward dog positions. Encouraged to trial (and demonstrated for patient) plank and downward dog positions on knees for improved comfort/ease in the meantime if attempting yoga.  Reviewed relevant scapular/RC/wrist extensor anatomy, tendonitis diagnosis, and basis for treatment.    Treatment Today     TREATMENT MINUTES COMMENTS   Evaluation     Self-care/ Home management     Manual therapy     Neuromuscular Re-education     Therapeutic Activity     Therapeutic Exercises 25 Ex per flow sheet   Gait training     Modality__________________                Total 25    Blank areas are intentional and mean the treatment did not include these items.       Alex Vinson  9/21/2017    "

## 2021-06-13 NOTE — PROGRESS NOTES
[facility] Daily Progress     Patient Name: Kasia Macedo  Date: 10/18/2017  Visit #: 8  PTA visit #:  1  PRECAUTIONS: none  Referral Diagnosis: Right Arm Pain  Referring provider: Gisella Durand  Visit Diagnosis:   No diagnosis found.      Assessment:     HEP/POC compliance is  good .  Patient demonstrates understanding/independence with home program.  Patient is benefitting from skilled physical therapy and is making steady progress toward functional goals.  Patient is appropriate to continue with skilled physical therapy intervention, as indicated by initial plan of care.   R shoulder pain and strength appears to be improving quite well, but R elbow pain and strength appears to continue to be quite limited and plateauing in progress. Plan to trial iontophoresis for treatment of R lateral epicondylitis.    Goal Status:  Pt. 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  Review PBall ex as necessary.    Subjective:     Pain Rating: Moderate R elbow/forearm.  Pt reports flare-up in pain recently after having to do a lot of typing this weekend. Still feels like the ionto patch was helpful. No skin itching or irritation with use.    Objective:     Skin clear over previous ionto  patch application; reapplied today. Reviewed proper wear time.  discussed getting elbow strap for work and using CP for pain relief    Treatment Today     TREATMENT MINUTES COMMENTS   Evaluation     Self-care/ Home management     Manual therapy     Neuromuscular Re-education     Therapeutic Activity     Therapeutic Exercises     Gait training     Modality__________________ 10 Tx #4  Iontophoresis 6 hour wear patch with dexamethasone, 1.0 mL, with saline applied to R lateral epicondyle and wrist extensor tendons after skin prepped.              Total 10    Blank areas are intentional and mean the treatment did not include these items.       Annika Dodd  10/18/2017

## 2021-06-13 NOTE — PROGRESS NOTES
[facility] Daily Progress     Patient Name: Kasia Macedo  Date: 10/12/2017  Visit #: 6  PTA visit #:    PRECAUTIONS: none  Referral Diagnosis: Right Arm Pain  Referring provider: Gisella Durand  Visit Diagnosis:     ICD-10-CM    1. Decreased ROM of right shoulder M25.611    2. Elbow pain, chronic, right M25.521 PT iontophoresis    G89.29    3. Right lateral epicondylitis M77.11 PT iontophoresis   4. Right rotator cuff tendonitis M75.81    5. Bicipital tendonitis of right shoulder M75.21    6. Chronic right shoulder pain M25.511     G89.29    7. Muscle weakness of right upper extremity M62.81          Assessment:     HEP/POC compliance is  good .  Patient demonstrates understanding/independence with home program.  Patient is benefitting from skilled physical therapy and is making steady progress toward functional goals.  Patient is appropriate to continue with skilled physical therapy intervention, as indicated by initial plan of care.   R shoulder pain and strength appears to be improving quite well, but R elbow pain and strength appears to continue to be quite limited and plateauing in progress. Plan to trial iontophoresis for treatment of R lateral epicondylitis.    Goal Status:  Pt. 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  Review PBall ex as necessary.    Subjective:     Pain Rating: \"It actually feels a little bit better.\"  Denies itching or skin irritation with iontophoresis patch.    Objective:     Skin clear over previous ionto patch application; reapplied today. Reviewed proper wear time.    Treatment Today     TREATMENT MINUTES COMMENTS   Evaluation     Self-care/ Home management     Manual therapy     Neuromuscular Re-education     Therapeutic Activity     Therapeutic Exercises     Gait training     Modality__________________ 8 Tx #2:  Iontophoresis 6 hour wear patch with dexamethasone, 1.3 mL, with saline applied to R lateral epicondyle and wrist extensor tendons after skin prepped.              Total 8    Blank areas are intentional and mean the treatment did not include these items.       Alex Vinson  10/12/2017    "

## 2021-06-13 NOTE — PROGRESS NOTES
[facility] Daily Progress     Patient Name: Kasia Macedo  Date: 10/16/2017  Visit #: 7  PTA visit #:    PRECAUTIONS: none  Referral Diagnosis: Right Arm Pain  Referring provider: Gisella Durand  Visit Diagnosis:     ICD-10-CM    1. Elbow pain, chronic, right M25.521     G89.29    2. Right lateral epicondylitis M77.11    3. Decreased ROM of right shoulder M25.611    4. Right rotator cuff tendonitis M75.81    5. Bicipital tendonitis of right shoulder M75.21    6. Chronic right shoulder pain M25.511     G89.29    7. Muscle weakness of right upper extremity M62.81          Assessment:     HEP/POC compliance is  good .  Patient demonstrates understanding/independence with home program.  Patient is benefitting from skilled physical therapy and is making steady progress toward functional goals.  Patient is appropriate to continue with skilled physical therapy intervention, as indicated by initial plan of care.   R shoulder pain and strength appears to be improving quite well, but R elbow pain and strength appears to continue to be quite limited and plateauing in progress. Plan to trial iontophoresis for treatment of R lateral epicondylitis.    Goal Status:  Pt. 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  Review PBall ex as necessary.    Subjective:     Pain Rating: Moderate R elbow/forearm.  Pt reports flare-up in pain recently after having to do a lot of typing this weekend. Still feels like the ionto patch was helpful. No skin itching or irritation with use.    Objective:     Skin clear over previous ionto patch application; reapplied today. Reviewed proper wear time.  Considering recent flare-up in epicondylitis with typing overuse, encouraged patient to hold on eccentric strengthening in the meantime, with focus on wrist extensor stretches, ice, and CFM, in addition to iontophoresis.    Treatment Today     TREATMENT MINUTES COMMENTS   Evaluation     Self-care/ Home management     Manual therapy     Neuromuscular Re-education     Therapeutic Activity     Therapeutic Exercises     Gait training     Modality__________________ 8 Tx #3:  Iontophoresis 6 hour wear patch with dexamethasone, 1.3 mL, with saline applied to R lateral epicondyle and wrist extensor tendons after skin prepped.              Total 8    Blank areas are intentional and mean the treatment did not include these items.       Alex Vinson  10/16/2017

## 2021-06-13 NOTE — PROGRESS NOTES
[facility] Daily Progress     Patient Name: Kasia Macedo  Date: 10/26/2017  Visit #: 10  PTA visit #:  1  PRECAUTIONS: none  Referral Diagnosis: Right Arm Pain  Referring provider: Gisella Durand  Visit Diagnosis:     ICD-10-CM    1. Elbow pain, chronic, right M25.521     G89.29    2. Right lateral epicondylitis M77.11    3. Decreased ROM of right shoulder M25.611    4. Right rotator cuff tendonitis M75.81    5. Bicipital tendonitis of right shoulder M75.21    6. Chronic right shoulder pain M25.511     G89.29    7. Muscle weakness of right upper extremity M62.81          Assessment:     HEP/POC compliance is  good .  Patient demonstrates understanding/independence with home program.  Patient is benefitting from skilled physical therapy and is making steady progress toward functional goals.  Patient is appropriate to continue with skilled physical therapy intervention, as indicated by initial plan of care.   R shoulder pain and strength appears to be improving quite well, but R elbow pain and strength appears to continue to be quite limited, progressing slowly with use of iontophoresis patches.    Goal Status:  Pt. 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  Follow-up on weight able to use  for R wrist extensor eccentrics.    Subjective:     Pain Rating: Mild R forearm/elbow.  Not much new since last visit. Finding the use of rubber bands around the forearm to displace forces from lateral epicondyle helpful.    Objective:     Skin clear over previous ionto patch application; reapplied today. Reviewed proper wear time.  Mild TTP directly at R lateral epicondyle. Denies TTP wrist extensor tendons.    Treatment Today     TREATMENT MINUTES COMMENTS   Evaluation     Self-care/ Home management     Manual therapy     Neuromuscular Re-education     Therapeutic Activity     Therapeutic Exercises     Gait training     Modality__________________ 8 Tx #6  Iontophoresis 6 hour wear patch with dexamethasone, 1.0 mL, with saline applied to R lateral epicondyle and wrist extensor tendons after skin prepped.              Total 8    Blank areas are intentional and mean the treatment did not include these items.       Alex Alisson  10/26/2017    [facility] Discharge Summary  Patient Name: Kasia Macedo  Date: 12/13/2017  Referral Diagnosis: Right Arm Pain  Referring provider: Gisella Durand  Visit Diagnosis:   1. Elbow pain, chronic, right     2. Right lateral epicondylitis     3. Decreased ROM of right shoulder     4. Right rotator cuff tendonitis     5. Bicipital tendonitis of right shoulder     6. Chronic right shoulder pain     7. Muscle weakness of right upper extremity         Goals:  Pt. 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  R shoulder pain and strength appears to be improving quite well, but R elbow pain and strength appears to continue to be quite limited, progressing slowly with use of iontophoresis patches.  Patient received a home program .  The patient discontinued therapy, did not return.    Therapy will be discontinued at this time.  The patient will need a new referral to resume.    Thank you for your referral.  Alex Vinson  12/13/2017  11:19 AM

## 2021-06-13 NOTE — PROGRESS NOTES
[facility] Daily Progress     Patient Name: Kasia Macedo  Date: 10/24/2017  Visit #: 9  PTA visit #:  1  PRECAUTIONS: none  Referral Diagnosis: Right Arm Pain  Referring provider: Gisella Durand  Visit Diagnosis:     ICD-10-CM    1. Elbow pain, chronic, right M25.521     G89.29    2. Right lateral epicondylitis M77.11    3. Decreased ROM of right shoulder M25.611    4. Right rotator cuff tendonitis M75.81    5. Bicipital tendonitis of right shoulder M75.21    6. Chronic right shoulder pain M25.511     G89.29    7. Muscle weakness of right upper extremity M62.81          Assessment:     HEP/POC compliance is  good .  Patient demonstrates understanding/independence with home program.  Patient is benefitting from skilled physical therapy and is making steady progress toward functional goals.  Patient is appropriate to continue with skilled physical therapy intervention, as indicated by initial plan of care.   R shoulder pain and strength appears to be improving quite well, but R elbow pain and strength appears to continue to be quite limited, progressing slowly with use of iontophoresis patches.    Goal Status:  Pt. 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  Review Heather ex as  "necessary.    Subjective:     Pain Rating: Mild R forearm/elbow.  The R elbow is feeling a little better. Making sure to stretch every hour while typing, and icing more. Have not been able to find the correct elbow strap yet.  Did some yoga, including down- and up-dog. The shoulder has felt fine with that, and \"I can feel the muscles in my back working more now.\"    Objective:     Skin clear over previous ionto patch application; reapplied today. Reviewed proper wear time.  Reviewed benefits and use of elbow strap; distributed \"patellar strap\" to be used over wrist extensor tendons from the clinic.    Treatment Today     TREATMENT MINUTES COMMENTS   Evaluation     Self-care/ Home management     Manual therapy     Neuromuscular Re-education     Therapeutic Activity     Therapeutic Exercises     Gait training     Modality__________________ 10 Tx #5  Iontophoresis 6 hour wear patch with dexamethasone, 1.0 mL, with saline applied to R lateral epicondyle and wrist extensor tendons after skin prepped.              Total 10    Blank areas are intentional and mean the treatment did not include these items.       Alex Vinson  10/24/2017    "

## 2021-06-13 NOTE — PROGRESS NOTES
[facility] Daily Progress     Patient Name: Kasia Macedo  Date: 10/10/2017  Visit #: 5  PTA visit #:    PRECAUTIONS: none  Referral Diagnosis: Right Arm Pain  Referring provider: Gisella Durand  Visit Diagnosis:     ICD-10-CM    1. Decreased ROM of right shoulder M25.611    2. Right rotator cuff tendonitis M75.81    3. Bicipital tendonitis of right shoulder M75.21    4. Elbow pain, chronic, right M25.521     G89.29    5. Chronic right shoulder pain M25.511     G89.29    6. Right lateral epicondylitis M77.11    7. Muscle weakness of right upper extremity M62.81          Assessment:     HEP/POC compliance is  good .  Patient demonstrates understanding/independence with home program.  Patient is benefitting from skilled physical therapy and is making steady progress toward functional goals.  Patient is appropriate to continue with skilled physical therapy intervention, as indicated by initial plan of care.   R shoulder pain and strength appears to be improving quite well, but R elbow pain and strength appears to continue to be quite limited and plateauing in progress. Plan to trial iontophoresis for treatment of R lateral epicondylitis.    Goal Status:  Pt. 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  Review  "PBall ex as necessary.    Subjective:     Pain Rating: About the same in the R elbow/forearm (almost to the wrist even). R shoulder is getting better.  Patient reports two significant improvements: was able to do some yoga (with downward dog) and some UE exercises through a 7-min workout tape this AM. \"I can never do the chair tricep dips, but this time I was able to do it no problem. It was great!\" Downward dog still aggravates the R elbow and forearm, but the shoulder felt just fine; I feel much stronger through that area.    Objective:     Pt education provided on benefits, mechanism, use, and precautions with use of iontophoresis patch. Handouts provided as well, and patient verbalizes understanding and agreement to use.    Treatment Today     TREATMENT MINUTES COMMENTS   Evaluation     Self-care/ Home management     Manual therapy     Neuromuscular Re-education     Therapeutic Activity     Therapeutic Exercises 3 Yellow PBall:  Ts x10  Is x10   Gait training     Modality__________________ 8 Tx #1:  Iontophoresis 6 hour wear patch with dexamethasone, 1.3 mL, with saline applied to R lateral epicondyle and wrist extensor tendons after skin prepped.              Total 11    Blank areas are intentional and mean the treatment did not include these items.       Alex Vinson  10/10/2017    "

## 2021-06-13 NOTE — PROGRESS NOTES
[facility] Daily Progress     Patient Name: Kasia Macedo  Date: 10/5/2017  Visit #: 4  PTA visit #:    PRECAUTIONS: none  Referral Diagnosis: Right Arm Pain  Referring provider: Gisella Durand  Visit Diagnosis:     ICD-10-CM    1. Decreased ROM of right shoulder M25.611    2. Right rotator cuff tendonitis M75.81    3. Bicipital tendonitis of right shoulder M75.21    4. Elbow pain, chronic, right M25.521     G89.29    5. Chronic right shoulder pain M25.511     G89.29    6. Right lateral epicondylitis M77.11    7. Muscle weakness of right upper extremity M62.81          Assessment:     HEP/POC compliance is  good .  Patient demonstrates understanding/independence with home program.  Patient is appropriate to continue with skilled physical therapy intervention, as indicated by initial plan of care.   Pt with good tolerance for all ex without increased pain; fatigue only with strengthening exercises.  R shoulder pain and strength appears to be improving quite well, but R elbow pain and strength appears to continue to be quite limited and plateauing in progress. Plan to trial iontophoresis for treatment of R lateral epicondylitis.    Goal Status:  Pt. 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  Review PBall  ex as necessary.    Subjective:     Pain Ratin/10 at worst over the last week. Mild in the elbow/forearm currently.  Continuing to do HEP regularly, minus the ball exercises quite as much with recent trip to [place].  Have not done any yoga as of yet, mostly due to recent trip to [place], but did try some cat/cow and that went fine.  The shoulder is not really as tender when I do the CFM, but the elbow is still very tender. Not able to move past using 8 oz for wrist extensor eccentrics; otherwise, it is painful    Objective:     Issued GTB for shoulder ER strengthening.  Moderate TTP R wrist extensor tendons and lateral epicondyle.  Discussed option to include iontophoresis as part of treatment for R lateral epicondylitis. Pt education provided on benefits, mechanism, use, and precautions. Pt verbalizes understanding and agreement to use. Plan to place order to referring PCP for approval.    Treatment Today     TREATMENT MINUTES COMMENTS   Evaluation     Self-care/ Home management     Manual therapy     Neuromuscular Re-education     Therapeutic Activity     Therapeutic Exercises 24 Ex per flow sheet   Gait training     Modality__________________                Total 24    Blank areas are intentional and mean the treatment did not include these items.       Alex Vinson  10/5/2017

## 2021-06-14 NOTE — PROGRESS NOTES
Gavin Macedo is a 55 y.o. female who is being evaluated via a billable video visit.      How would you like to obtain your AVS? MyChart.  If dropped from the video visit, the video invitation should be resent by: Send to e-mail at: [email or web address]  Will anyone else be joining your video visit? No      Video Start Time: 4:57 PM  Assessment & Plan     Gavin was seen today for tremors.    Diagnoses and all orders for this visit:    Tremor- Will r/o hyperthyroidism since she has Hashimoto's.  If normal TFTs, I suspect benign essential tremor and consider neurology referral to confirm.  -     Thyroid Stimulating Hormone (TSH); Future  -     T3 (Triiodothyronine), Free; Future  -     T4, Free; Future    Encounter for screening for other metabolic disorders  -     Lipid Cascade; Future             No follow-ups on file.    Gisella Durand MD  [facility]     Gavin Macedo is 55 y.o. and presents to clinic today for the following health issues   HPI     She has a tremor in left hand.  Occurs when arm in certain position- in front of body/ holding phone.  Worse with intention.  Worse in last 6-8 weeks.  No tremor in right hand.  No neck/ head / voice tremor.  She is right handed.  No increased caffeine.  No new medicines or supplements.  She did start a lemon balm thyroid tincture.     She would like to check fasting lipids.     Patient Active Problem List   Diagnosis     Chronic Kidney Disease (NKF Classification)-Possible Thin basement membrane     History of hyperthyroidism     Graves disease     Family history of heart disease     Hypercholesterolemia     AK (actinic keratosis)      SH: .  Works as psychotherapist.      Review of Systems  Increased appetite.  Digestion ok.  Sleep is good.  Heart is racing by the end of day.  No muscle rigidity.      Objective       Vitals:  No vitals were obtained today due to virtual visit.    Physical Exam    None    Converted to a phone visit.  Total time on phone - 13 minutes    Gisella Durand

## 2021-06-16 NOTE — PROGRESS NOTES
SUBJECTIVE: Kasia Macedo is a 52 y.o. female with:  Chief Complaint   Patient presents with     Breast Pain     left breast pain on and off 1x month     She fell on left side of her low back in November then reinjured area skiing in January.  She started having pain in left breast in nipple area 1 month ago.  No rash / skin irritation.  No lump or mass.  The discomfort went away and then came back.  No discomfort in right breast.  No nipple discharge.  No estrogen.    FH: No breast problems.    Last mammogram:  4/16 negative with dense breasts.    OBJECTIVE: /64 (Patient Site: Left Arm, Patient Position: Sitting, Cuff Size: Adult Regular)  Pulse (!) 53  Resp 16  Wt 135 lb (61.2 kg)  LMP 06/01/2016  SpO2 99%  Breastfeeding? No  BMI 24.3 kg/m2 no distress  Breasts: Symmetric with normal shape and contour.  No lumps or masses.  No axillary lymphadenopathy.  No nipple discharge.    Kasia was seen today for breast pain.    Diagnoses and all orders for this visit:    Breast pain, left  -     Mammo Diagnostic Left; Future  -     US Breast Limited (Focal) Left; Future     Etiology unclear.  Will send for diagnostic breast imaging.    Gisella Durand

## 2021-06-18 NOTE — PATIENT INSTRUCTIONS - HE
Patient Instructions by Heather Arrington PT at 3/4/2020  1:30 PM     Author: Heather Arrington PT Service: -- Author Type: Physical Therapist    Filed: 3/4/2020  2:12 PM Encounter Date: 3/4/2020 Status: Signed    : Heather Arrington PT (Physical Therapist)        Mary Lou Akins    Lay on your back lengthwise on a foam roller, knees bent up.  Slide your hands on the floor up towards your head until you feel a stretch across your pecs.  Keep your hands resting on the floor.      Arm up and over head up and down x10         FOAM ROLL - SPINE ROLL - support head.     Start by lying on your back with a foam roll under your back.     Place your hands on your stomach and then slowly roll forward and back across your back using your legs as shown.

## 2021-06-18 NOTE — PATIENT INSTRUCTIONS - HE
Patient Instructions by Heather Arrington PT at 9/9/2020  8:00 AM     Author: Heather Arrington PT Service: -- Author Type: Physical Therapist    Filed: 9/9/2020  9:04 AM Encounter Date: 9/9/2020 Status: Addendum    : Heather Arrington PT (Physical Therapist)    Related Notes: Original Note by Heather Arrington PT (Physical Therapist) filed at 9/9/2020  8:25 AM        UPPER TRAP STRETCH    Begin by retracting your head back into a chin tuck position. Next, place one hand behind your back and gently draw your head towards the opposite side with the help of your other arm.    Hold 20-30 seconds       LEVATOR SCAPULAE STRETCH    Hold 20-30 seconds     Place the arm on the affected side behind your back and use your other hand to draw your head downward and towards the opposite side.     You should be looking towards your opposite pocket of the affected side.        PIRIFORMIS STRETCH    While lying on your back with both knee bent, cross your affected leg on the other knee.     Next, hold your unaffected thigh and pull it up towards your chest until a stretch is felt in the buttock.    Hold 20-30 seconds           PIRIFORMIS STRETCH - MODIFIED    While lying on your back, hold your knee with your opposite hand and draw your knee up and over towards your opposite shoulder.    Hold 20-30 seconds         PIRIFORMIS AND HIP STRETCH - SEATED    While sitting in a chair, cross your affected leg on top of the other as shown.     Next, gently lean forward until a stretch is felt along the crossed leg.    Hold 20-30 seconds         Quad stretch off edge of bed    Lie down on your back with your leg off the edge of your bed.  Put a belt or leash around your ankle and use it to bend your knee back until a stretch is felt in the front of your thigh.     Hold 20- 30 seconds

## 2021-06-18 NOTE — PATIENT INSTRUCTIONS - HE
Patient Instructions by Heather Arrington PT at 8/12/2020  8:30 AM     Author: Heather Arrington PT Service: -- Author Type: Physical Therapist    Filed: 8/12/2020  1:00 PM Encounter Date: 8/12/2020 Status: Addendum    : Heather Arrington PT (Physical Therapist)    Related Notes: Original Note by Heather Arrington PT (Physical Therapist) filed at 8/12/2020 12:55 PM        Pec corner stretch in corner or doorway     Find a comfortable position for your hands and lightly lean forward into the corner until you feel a good stretch in your pecs. Hold 20-30 sec, x2-3 reps          WAND INTERNAL ROTATION - STANDING    While holding a wand/cane behind your back with palms facing backwards, slide the wand up your back without leaning forward.  Use primarily your uninvolved arm to assist your involved arm.    x10         SIDELYING INTERNAL ROTATION STRETCH - SLEEPER STRETCH  Start by lying on your side with the affected arm on the bottom.Your affected arm should be bent at the elbow and forearm pointed upwards towards the ceiling as shown.Next, use your unaffected arm to gently draw your affected forearm towards the table or bed.    Hold 20-30 seconds        Perform exercises 2x a day.  Progress from one set to two sets as able.    Ice for 20 minutes if shoulder is feeling sore.

## 2021-06-18 NOTE — PATIENT INSTRUCTIONS - HE
Patient Instructions by Heather Arrington PT at 8/21/2020  7:30 AM     Author: Heather Arrington PT Service: -- Author Type: Physical Therapist    Filed: 8/21/2020  7:57 AM Encounter Date: 8/21/2020 Status: Addendum    : Heather Arrington PT (Physical Therapist)    Related Notes: Original Note by Heather Arrington PT (Physical Therapist) filed at 8/21/2020  7:54 AM        PRONE ON ELBOWS - FALGUNI    Lying face down, slowly press up and prop yourself up on your elbows.    Every 1-2 hours hold 1-2 minutes       STANDING EXTENSIONS    Start by standing and place your hands on your hips with your thumbs grasping your low back. Lean back to arch your back then return to starting position. Use your thumbs to help isolate where you want to bend.   Every hour x10 reps         SCAPULAR RETRACTIONS    Draw your shoulder blades back and down.    Can hold up to 5 seconds. Perform about 10 reps 3-4x a day     ELASTIC BAND ROWS     Holding elastic band with both hands, draw back the band as you bend your elbows. Keep your elbows near the side of your body.    Yellow band. Start with 1 set of 10-15 reps. 1x a day              SIDELYING EXTERNAL ROTATION WITH TOWEL    Lie on your side with your elbow bent to 90 degrees. Place a rolled up towel between your arm and the side your body as shown.     Squeeze your shoulder blade back and down toward your buttocks and hold that position.     Next, roll your arm upwards from your stomach area towards the ceiling while maintaining your arm against the towel and with your shoulder blade held down and back the entire time. Lower your arm and repeat.   Add 1#, start with 1 set 15-20 reps - 1x a day       TOWEL STRETCH    Gently pull up your affected arm behind your back with the assist of a towel    Up and down x10 2x a day

## 2021-06-19 NOTE — LETTER
Letter by Gisella Durand MD at      Author: Gisella Durand MD Service: -- Author Type: --    Filed:  Encounter Date: 5/29/2019 Status: (Other)           May 29, 2019        Kasia Macedo  [address]  [facility] [address]        Dear Kasia,    Breast cancer is the most common type of cancer among American women. The earlier breast cancer is detected, the better the survival rate. Your best defense against breast cancer is having a screening mammogram on a regular basis. The American Cancer Society recommends that women with an average risk of breast cancer should undergo regular screening mammography starting at age 45 years.         Women aged 45 to 54 years should have a mammogram annually.     Women 55 years and older should have a mammogram at least every other year.     There may be important reasons for you to follow a more frequent screening plan or an earlier start for breast cancer screening, so please discuss your health history and your family health history with your primary care provider.       We reviewed our records and we do not see that you had a mammogram within the past two years. If you have not had a mammogram in the past two years, we strongly encourage you to call for your appointment today. For your convenience, we have included a phone number below for you to schedule your mammogram at a nearby [facility] location.      If you have had a recent mammogram or have questions about why you are receiving this letter, please contact your primary care clinic at [phone] or send a Quipper message  ([email or web address]). Your clinic will answer any questions or help obtain recent mammogram reports for your chart at [facility] so we can keep record of your preventive care.       Sincerely,    Gisella Durand MD    and your primary care team at [facility]  Schedule your mammogram today at one of our   locations  Please call  [phone]

## 2021-07-02 ENCOUNTER — THERAPY VISIT (OUTPATIENT)
Dept: PHYSICAL THERAPY | Facility: CLINIC | Age: 56
End: 2021-07-02
Attending: STUDENT IN AN ORGANIZED HEALTH CARE EDUCATION/TRAINING PROGRAM
Payer: COMMERCIAL

## 2021-07-02 DIAGNOSIS — G89.29 CHRONIC PAIN OF LEFT KNEE: ICD-10-CM

## 2021-07-02 DIAGNOSIS — M25.562 CHRONIC PAIN OF LEFT KNEE: ICD-10-CM

## 2021-07-02 PROCEDURE — 97530 THERAPEUTIC ACTIVITIES: CPT | Mod: GP | Performed by: PHYSICAL THERAPIST

## 2021-07-02 PROCEDURE — 97161 PT EVAL LOW COMPLEX 20 MIN: CPT | Mod: GP | Performed by: PHYSICAL THERAPIST

## 2021-07-02 ASSESSMENT — ACTIVITIES OF DAILY LIVING (ADL)
SWELLING: I DO NOT HAVE THE SYMPTOM
SIT WITH YOUR KNEE BENT: ACTIVITY IS VERY DIFFICULT
HOW_WOULD_YOU_RATE_THE_CURRENT_FUNCTION_OF_YOUR_KNEE_DURING_YOUR_USUAL_DAILY_ACTIVITIES_ON_A_SCALE_FROM_0_TO_100_WITH_100_BEING_YOUR_LEVEL_OF_KNEE_FUNCTION_PRIOR_TO_YOUR_INJURY_AND_0_BEING_THE_INABILITY_TO_PERFORM_ANY_OF_YOUR_USUAL_DAILY_ACTIVITIES?: 75
KNEE_ACTIVITY_OF_DAILY_LIVING_SCORE: 74.29
KNEE_ACTIVITY_OF_DAILY_LIVING_SUM: 52
RISE FROM A CHAIR: ACTIVITY IS MINIMALLY DIFFICULT
GO DOWN STAIRS: ACTIVITY IS MINIMALLY DIFFICULT
AS_A_RESULT_OF_YOUR_KNEE_INJURY,_HOW_WOULD_YOU_RATE_YOUR_CURRENT_LEVEL_OF_DAILY_ACTIVITY?: NEARLY NORMAL
GO UP STAIRS: ACTIVITY IS MINIMALLY DIFFICULT
STIFFNESS: I DO NOT HAVE THE SYMPTOM
PAIN: THE SYMPTOM AFFECTS MY ACTIVITY MODERATELY
STAND: ACTIVITY IS MINIMALLY DIFFICULT
GIVING WAY, BUCKLING OR SHIFTING OF KNEE: I DO NOT HAVE THE SYMPTOM
HOW_WOULD_YOU_RATE_THE_OVERALL_FUNCTION_OF_YOUR_KNEE_DURING_YOUR_USUAL_DAILY_ACTIVITIES?: NEARLY NORMAL
LIMPING: I DO NOT HAVE THE SYMPTOM
RAW_SCORE: 52
KNEEL ON THE FRONT OF YOUR KNEE: ACTIVITY IS SOMEWHAT DIFFICULT
SQUAT: ACTIVITY IS SOMEWHAT DIFFICULT
WALK: ACTIVITY IS MINIMALLY DIFFICULT
WEAKNESS: THE SYMPTOM AFFECTS MY ACTIVITY SLIGHTLY

## 2021-07-02 NOTE — PROGRESS NOTES
Physical Therapy Initial Examination/Evaluation  July 2, 2021    Kasia Macedo is a 56 year old female referred to physical therapy by Blaire Flowers for treatment of L knee pain with Precautions/Restrictions/MD instructions E&T    Therapist Impression:   Kasia presents with LCL sprain on L and great toe extensor tendonitis on R.  Initiated isometrics for tendonitis, and trial KT tape and active rest for knee.    GOALS: running, sitting on ground  Subjective:  DOI/onset: April 2021 DOS: none  Acute Injury or Gradual Onset?: Gradual injury over time  Mechanism of Injury: Sitting  Related PMH: None Previous Treatment: Rest, Ice and acupuncture Effect of prior treatment: good  Imaging: None  Chief Complaint/Functional Limitations:   Sitting, running and see below in therapy evaluation codes   Pain: rest 0 /10, activity 5/10 Location: medial and lateral knee  Frequency: Intermittent Described as: sharp Alleviated by: Rest and Ice Progression of Symptoms: Gradually getting better. Time of day when pain is worse: Position related  Sleeping: No issues/uninterrupted   Occupation: Psychotherapist  Job duties: prolonged sitting, keyboarding/computer use  Current HEP/exercise regimen: Running, cycling, yoga  Patient's goals are see chief complaints     Other pertinent PMH/Red Flags: Thyroid Problems   Barriers at home/work: None as reported by patient  Pertinent Surgical History: None  Medications: None as reported by patient  General health as reported by patient: good  Return to MD:  Prn    KNEE EVALUATION    Static Posture  No obvious findings    Dynamic Movement Screen  Single leg stance observations: Eyes open no significant findings   Double limb squat observations: Good technique/no significant findings, pain with deep squat  Single limb squat observations: Increased trunk lean  Gait: Not assessed    Range of Motion    Knee ROM Extension Flexion   Left wnl wnl   Right wnl wnl      Flexibility Quadriceps  Hamstrings Ankle Figure 4   Left NA NA 12.5 cm none/WNL   Right NA NA 11.5 cm none/WNL     Knee MMT Quadriceps set Straight Leg Raise   Left Good Able   Right Good Able     Knee ligaments and meniscus: LCL positive TTP and varus stress test and Meniscus negative, negative knee effusion B    Patellofemoral assessment: J-tracking mild B    Assessment/Plan:  Patient is a 56 year old female with left side knee complaints.    Patient has the following significant findings with corresponding treatment plan.                Diagnosis 1:  L knee pain  Pain -  hot/cold therapy, manual therapy, splint/taping/bracing/orthotics, self management, education and home program  Decreased ROM/flexibility - manual therapy and therapeutic exercise    Therapy Evaluation Codes:   1) History comprised of:   Personal factors that impact the plan of care:      None.    Comorbidity factors that impact the plan of care are:      None.     Medications impacting care: None.  2) Examination of Body Systems comprised of:   Body structures and functions that impact the plan of care:      Knee.   Activity limitations that impact the plan of care are:      Sitting and Squatting/kneeling.  3) Clinical presentation characteristics are:   Stable/Uncomplicated.  4) Decision-Making    Low complexity using standardized patient assessment instrument and/or measureable assessment of functional outcome.  Cumulative Therapy Evaluation is: Low complexity.    Previous and current functional limitations:  (See Goal Flow Sheet for this information)    Short term and Long term goals: (See Goal Flow Sheet for this information)     Communication ability:  Patient appears to be able to clearly communicate and understand verbal and written communication and follow directions correctly.  Treatment Explanation - The following has been discussed with the patient:   RX ordered/plan of care  Anticipated outcomes  Possible risks and side effects  This patient would benefit  from PT intervention to resume normal activities.   Rehab potential is good.    Frequency:  2 X a month, once daily  Duration:  for 3 months  Discharge Plan:  Achieve all LTG.  Independent in home treatment program.  Reach maximal therapeutic benefit.      Please refer to the daily flowsheet for treatment today, total treatment time and time spent performing 1:1 timed codes.     Please refer to the daily flowsheet for treatment today, total treatment time and time spent performing 1:1 timed codes.

## 2021-07-23 ENCOUNTER — THERAPY VISIT (OUTPATIENT)
Dept: PHYSICAL THERAPY | Facility: CLINIC | Age: 56
End: 2021-07-23
Payer: COMMERCIAL

## 2021-07-23 DIAGNOSIS — G89.29 CHRONIC PAIN OF LEFT KNEE: ICD-10-CM

## 2021-07-23 DIAGNOSIS — M25.562 CHRONIC PAIN OF LEFT KNEE: ICD-10-CM

## 2021-07-23 PROCEDURE — 97530 THERAPEUTIC ACTIVITIES: CPT | Mod: GP | Performed by: PHYSICAL THERAPIST

## 2021-07-23 PROCEDURE — 97110 THERAPEUTIC EXERCISES: CPT | Mod: GP | Performed by: PHYSICAL THERAPIST

## 2021-07-23 NOTE — PROGRESS NOTES
Therapist Impression:   Doing well.  Progressed to isotonics.  Possible hip impingement vs hip OA.    GOALS: Running, sitting, calf raises/dance    NEXT: continue POC    PTRX: handouts    Subjective:  Toe is maybe a little better.    Objective:  Pain with full PF calf raises  Positive ALETHEANICHO L and L hip pain with R FLAVIAIR  Good squat mechanics

## 2021-09-04 ENCOUNTER — HEALTH MAINTENANCE LETTER (OUTPATIENT)
Age: 56
End: 2021-09-04

## 2021-10-14 ENCOUNTER — IMMUNIZATION (OUTPATIENT)
Dept: NURSING | Facility: CLINIC | Age: 56
End: 2021-10-14
Payer: COMMERCIAL

## 2021-10-14 PROCEDURE — 91300 PR COVID VAC PFIZER DIL RECON 30 MCG/0.3 ML IM: CPT

## 2021-10-14 PROCEDURE — 0004A PR COVID VAC PFIZER DIL RECON 30 MCG/0.3 ML IM: CPT

## 2021-10-30 ENCOUNTER — HEALTH MAINTENANCE LETTER (OUTPATIENT)
Age: 56
End: 2021-10-30

## 2021-11-21 NOTE — PROGRESS NOTES
55 yo F PMH AKs, Graves disease, HLD, CKD with h/o thin basement membrane disease and has seen nephrology in past.     CKD - Last seen by nephrologoy in 6/2020 for microscopic hematuria. Thought possibly very benign thin membrane disease and recommend yearly U/A.     HLD - previously had NMR lipid panel with favorable LDL size, negative CAC score.     HCM:    Last labs done in 2/2021.   Pap smear 2/19/2020: NSIL, negative HPV. No pap since 2014 prior to that and history of ASCUS.   Mammogram 6/7/21: normal range.   Colonscopy 12/2016 and unremarkable. Recheck in 2026.

## 2021-11-24 ENCOUNTER — OFFICE VISIT (OUTPATIENT)
Dept: FAMILY MEDICINE | Facility: CLINIC | Age: 56
End: 2021-11-24
Payer: COMMERCIAL

## 2021-11-24 VITALS
HEIGHT: 63 IN | BODY MASS INDEX: 23.04 KG/M2 | DIASTOLIC BLOOD PRESSURE: 83 MMHG | RESPIRATION RATE: 16 BRPM | WEIGHT: 130 LBS | SYSTOLIC BLOOD PRESSURE: 129 MMHG | TEMPERATURE: 97.2 F | HEART RATE: 80 BPM | OXYGEN SATURATION: 97 %

## 2021-11-24 DIAGNOSIS — Z12.4 SCREENING FOR CERVICAL CANCER: ICD-10-CM

## 2021-11-24 DIAGNOSIS — Z00.00 ROUTINE GENERAL MEDICAL EXAMINATION AT A HEALTH CARE FACILITY: Primary | ICD-10-CM

## 2021-11-24 DIAGNOSIS — N02.9 THIN BASEMENT MEMBRANE DISEASE: ICD-10-CM

## 2021-11-24 DIAGNOSIS — H91.92 HEARING LOSS OF LEFT EAR, UNSPECIFIED HEARING LOSS TYPE: ICD-10-CM

## 2021-11-24 DIAGNOSIS — E78.00 HYPERCHOLESTEROLEMIA: ICD-10-CM

## 2021-11-24 DIAGNOSIS — N18.9 CHRONIC KIDNEY DISEASE, UNSPECIFIED CKD STAGE: ICD-10-CM

## 2021-11-24 LAB
ALBUMIN UR-MCNC: 30 MG/DL
ANION GAP SERPL CALCULATED.3IONS-SCNC: 4 MMOL/L (ref 3–14)
APPEARANCE UR: CLEAR
BACTERIA #/AREA URNS HPF: ABNORMAL /HPF
BILIRUB UR QL STRIP: NEGATIVE
BUN SERPL-MCNC: 11 MG/DL (ref 7–30)
CALCIUM SERPL-MCNC: 9.2 MG/DL (ref 8.5–10.1)
CHLORIDE BLD-SCNC: 107 MMOL/L (ref 94–109)
CHOLEST SERPL-MCNC: 327 MG/DL
CO2 SERPL-SCNC: 30 MMOL/L (ref 20–32)
COLOR UR AUTO: YELLOW
CREAT SERPL-MCNC: 0.6 MG/DL (ref 0.52–1.04)
FASTING STATUS PATIENT QL REPORTED: YES
GFR SERPL CREATININE-BSD FRML MDRD: >90 ML/MIN/1.73M2
GLUCOSE BLD-MCNC: 85 MG/DL (ref 70–99)
GLUCOSE UR STRIP-MCNC: NEGATIVE MG/DL
HBA1C MFR BLD: 5.4 % (ref 0–5.6)
HDLC SERPL-MCNC: 94 MG/DL
HGB UR QL STRIP: ABNORMAL
KETONES UR STRIP-MCNC: NEGATIVE MG/DL
LDLC SERPL CALC-MCNC: 213 MG/DL
LEUKOCYTE ESTERASE UR QL STRIP: NEGATIVE
NITRATE UR QL: NEGATIVE
NONHDLC SERPL-MCNC: 233 MG/DL
PH UR STRIP: 7 [PH] (ref 5–7)
POTASSIUM BLD-SCNC: 3.8 MMOL/L (ref 3.4–5.3)
RBC #/AREA URNS AUTO: ABNORMAL /HPF
SODIUM SERPL-SCNC: 141 MMOL/L (ref 133–144)
SP GR UR STRIP: 1.02 (ref 1–1.03)
TRIGL SERPL-MCNC: 102 MG/DL
UROBILINOGEN UR STRIP-ACNC: 0.2 E.U./DL
WBC #/AREA URNS AUTO: ABNORMAL /HPF

## 2021-11-24 PROCEDURE — 83036 HEMOGLOBIN GLYCOSYLATED A1C: CPT | Performed by: STUDENT IN AN ORGANIZED HEALTH CARE EDUCATION/TRAINING PROGRAM

## 2021-11-24 PROCEDURE — 80048 BASIC METABOLIC PNL TOTAL CA: CPT | Performed by: STUDENT IN AN ORGANIZED HEALTH CARE EDUCATION/TRAINING PROGRAM

## 2021-11-24 PROCEDURE — 99396 PREV VISIT EST AGE 40-64: CPT | Mod: 25 | Performed by: STUDENT IN AN ORGANIZED HEALTH CARE EDUCATION/TRAINING PROGRAM

## 2021-11-24 PROCEDURE — G0145 SCR C/V CYTO,THINLAYER,RESCR: HCPCS | Performed by: STUDENT IN AN ORGANIZED HEALTH CARE EDUCATION/TRAINING PROGRAM

## 2021-11-24 PROCEDURE — 80061 LIPID PANEL: CPT | Performed by: STUDENT IN AN ORGANIZED HEALTH CARE EDUCATION/TRAINING PROGRAM

## 2021-11-24 PROCEDURE — 87624 HPV HI-RISK TYP POOLED RSLT: CPT | Performed by: STUDENT IN AN ORGANIZED HEALTH CARE EDUCATION/TRAINING PROGRAM

## 2021-11-24 PROCEDURE — 99214 OFFICE O/P EST MOD 30 MIN: CPT | Mod: 25 | Performed by: STUDENT IN AN ORGANIZED HEALTH CARE EDUCATION/TRAINING PROGRAM

## 2021-11-24 PROCEDURE — 81001 URINALYSIS AUTO W/SCOPE: CPT | Performed by: STUDENT IN AN ORGANIZED HEALTH CARE EDUCATION/TRAINING PROGRAM

## 2021-11-24 PROCEDURE — 90471 IMMUNIZATION ADMIN: CPT | Performed by: STUDENT IN AN ORGANIZED HEALTH CARE EDUCATION/TRAINING PROGRAM

## 2021-11-24 PROCEDURE — 36415 COLL VENOUS BLD VENIPUNCTURE: CPT | Performed by: STUDENT IN AN ORGANIZED HEALTH CARE EDUCATION/TRAINING PROGRAM

## 2021-11-24 PROCEDURE — 90682 RIV4 VACC RECOMBINANT DNA IM: CPT | Performed by: STUDENT IN AN ORGANIZED HEALTH CARE EDUCATION/TRAINING PROGRAM

## 2021-11-24 ASSESSMENT — ENCOUNTER SYMPTOMS
HEMATURIA: 0
NAUSEA: 0
HEARTBURN: 0
EYE PAIN: 0
ARTHRALGIAS: 0
FEVER: 0
PALPITATIONS: 0
WEAKNESS: 0
ABDOMINAL PAIN: 0
HEMATOCHEZIA: 0
BREAST MASS: 0
JOINT SWELLING: 0
DYSURIA: 0
CONSTIPATION: 0
HEADACHES: 0
DIARRHEA: 0
SHORTNESS OF BREATH: 0
COUGH: 0
SORE THROAT: 0
MYALGIAS: 0
PARESTHESIAS: 0
DIZZINESS: 0
CHILLS: 0
FREQUENCY: 0
NERVOUS/ANXIOUS: 0

## 2021-11-24 ASSESSMENT — MIFFLIN-ST. JEOR: SCORE: 1139.84

## 2021-11-24 NOTE — PROGRESS NOTES
SUBJECTIVE:   CC: Kasia Macedo is an 56 year old woman who presents for preventive health visit.     Patient has been advised of split billing requirements and indicates understanding: Yes  Healthy Habits:     Getting at least 3 servings of Calcium per day:  Yes    Bi-annual eye exam:  Yes    Dental care twice a year:  Yes    Sleep apnea or symptoms of sleep apnea:  None    Diet:  Gluten-free/reduced    Frequency of exercise:  4-5 days/week    Duration of exercise:  30-45 minutes    Taking medications regularly:  Yes    PHQ-2 Total Score: 0    Additional concerns today:  Yes    57 yo F PMH AKs, Graves disease, HLD, CKD with h/o thin basement membrane disease and has seen nephrology in past.     CKD - Last seen by nephrology in 6/2020 for microscopic hematuria. Thought possibly very benign thin membrane disease and recommend yearly U/A.     HLD - previously had NMR lipid panel with favorable LDL size, negative CAC score.     Acute concerns: feels like she is losing hearing in left ear over past couple of years. Does also have tinnitus both ears that seems constant--not too bothersome.     HCM:    Last labs done in 2/2021.   Pap smear 2/19/2020: NSIL, negative HPV. No pap since 2014 prior to that and history of ASCUS.   Mammogram 6/7/21: normal range.   Colonscopy 12/2016 and unremarkable. Recheck in 2026.     Family history - both parents had dementia. Mother had high cholesterol.       Today's PHQ-2 Score:   PHQ-2 ( 1999 [employer]) 11/24/2021   Q1: Little interest or pleasure in doing things 0   Q2: Feeling down, depressed or hopeless 0   PHQ-2 Score 0   Q1: Little interest or pleasure in doing things Not at all   Q2: Feeling down, depressed or hopeless Not at all   PHQ-2 Score 0       Abuse: Current or Past (Physical, Sexual or Emotional) - No  Do you feel safe in your environment? Yes    Have you ever done Advance Care Planning? (For example, a Health Directive, POLST, or a discussion with a medical provider  or your loved ones about your wishes): No, advance care planning information given to patient to review.  Patient plans to discuss their wishes with loved ones or provider.      Social History     Tobacco Use     Smoking status: Never Smoker     Smokeless tobacco: Never Used   Substance Use Topics     Alcohol use: Yes     Comment: 1 drink a week       Alcohol Use 11/24/2021   Prescreen: >3 drinks/day or >7 drinks/week? No   Prescreen: >3 drinks/day or >7 drinks/week? -     Reviewed orders with patient.  Reviewed health maintenance and updated orders accordingly - Yes  Lab work is in process    Breast Cancer Screening:    Breast CA Risk Assessment (FHS-7) 11/24/2021   Do you have a family history of breast, colon, or ovarian cancer? No / Unknown   Pertinent mammograms are reviewed under the imaging tab.    History of abnormal Pap smear: NO - age 30-65 PAP every 5 years with negative HPV co-testing recommended  PAP / HPV Latest Ref Rng & Units 2/19/2020 8/28/2013 6/1/2011   PAP Negative for squamous intraepithelial lesion or malignancy. Negative for squamous intraepithelial lesion or malignancy  Electronically signed by Corazon Frederick CT (ASCP) on 2/26/2020 at 11:16 AM   - -   PAP (Historical) - - ASC-US(A) ASC-US(A)   HPV16 NEG Negative - -   HPV18 NEG Negative - -   HRHPV NEG Negative - -     Reviewed and updated as needed this visit by clinical staff                Reviewed and updated as needed this visit by Provider                   Review of Systems   Constitutional: Negative for chills and fever.   HENT: Positive for hearing loss. Negative for congestion, ear pain and sore throat.    Eyes: Negative for pain and visual disturbance.   Respiratory: Negative for cough and shortness of breath.    Cardiovascular: Negative for chest pain, palpitations and peripheral edema.   Gastrointestinal: Negative for abdominal pain, constipation, diarrhea, heartburn, hematochezia and nausea.   Breasts:  Negative for  tenderness, breast mass and discharge.   Genitourinary: Negative for dysuria, frequency, genital sores, hematuria, pelvic pain, urgency, vaginal bleeding and vaginal discharge.   Musculoskeletal: Negative for arthralgias, joint swelling and myalgias.   Skin: Negative for rash.   Neurological: Negative for dizziness, weakness, headaches and paresthesias.   Psychiatric/Behavioral: Negative for mood changes. The patient is not nervous/anxious.         OBJECTIVE:   LMP 08/21/2013   Physical Exam  GENERAL: healthy, alert and no distress  EYES: Eyes grossly normal to inspection, PERRL and conjunctivae and sclerae normal  HENT: ear canals and TM's normal, nose and mouth without ulcers or lesions  NECK: no adenopathy, no asymmetry, masses, or scars and thyroid normal to palpation  RESP: lungs clear to auscultation - no rales, rhonchi or wheezes  BREAST: normal without masses, tenderness or nipple discharge and no palpable axillary masses or adenopathy  CV: regular rate and rhythm, normal S1 S2, no S3 or S4, no murmur, click or rub, no peripheral edema and peripheral pulses strong  ABDOMEN: soft, nontender, no hepatosplenomegaly, no masses and bowel sounds normal   (female): normal female external genitalia, normal urethral meatus , vaginal mucosa pink, moist, well rugated and normal cervix, adnexae, and uterus without masses.  MS: no gross musculoskeletal defects noted, no edema  SKIN: no suspicious lesions or rashes  NEURO: Normal strength and tone, mentation intact and speech normal  PSYCH: mentation appears normal, affect normal/bright      ASSESSMENT/PLAN:   Kasia was seen today for physical.    Diagnoses and all orders for this visit:    Routine general medical examination at a health care facility  We will update her yearly labs for routine screens and due to h/o kidney disease also obtain U/A, BMP. Referral placed to audiology for hearing concern.    Chronic kidney disease, unspecified CKD stage  -     UA Macro  "with Reflex to Micro and Culture - lab collect; Future  -     Basic metabolic panel  (Ca, Cl, CO2, Creat, Gluc, K, Na, BUN); Future  -     UA Macro with Reflex to Micro and Culture - lab collect  -     Basic metabolic panel  (Ca, Cl, CO2, Creat, Gluc, K, Na, BUN)    Thin basement membrane disease  -     UA Macro with Reflex to Micro and Culture - lab collect; Future  -     UA Macro with Reflex to Micro and Culture - lab collect    Hypercholesterolemia  -     Lipid panel reflex to direct LDL Fasting; Future  -     Hemoglobin A1c; Future  -     Lipid panel reflex to direct LDL Fasting  -     Hemoglobin A1c    Hearing loss of left ear, unspecified hearing loss type  -     Adult Audiology Referral; Future    Screening for cervical cancer  -     Pap screen with HPV - recommended age 30 - 65 years; Future  -     Pap screen with HPV - recommended age 30 - 65 years    Other orders  -     INFLUENZA QUAD, RECOMBINANT, P-FREE (RIV4) (FLUBLOK)  -     REVIEW OF HEALTH MAINTENANCE PROTOCOL ORDERS      Patient has been advised of split billing requirements and indicates understanding: Yes  COUNSELING:  Reviewed preventive health counseling, as reflected in patient instructions    Estimated body mass index is 23.78 kg/m  as calculated from the following:    Height as of 9/19/20: 1.575 m (5' 2\").    Weight as of 6/8/21: 59 kg (130 lb).        She reports that she has never smoked. She has never used smokeless tobacco.      Counseling Resources:  ATP IV Guidelines  Pooled Cohorts Equation Calculator  Breast Cancer Risk Calculator  BRCA-Related Cancer Risk Assessment: FHS-7 Tool  FRAX Risk Assessment  ICSI Preventive Guidelines  Dietary Guidelines for Americans, 2010  USDA's MyPlate  ASA Prophylaxis  Lung CA Screening    Blaire Paredes MD  [facility]  "

## 2021-11-26 ENCOUNTER — OFFICE VISIT (OUTPATIENT)
Dept: AUDIOLOGY | Facility: CLINIC | Age: 56
End: 2021-11-26
Attending: STUDENT IN AN ORGANIZED HEALTH CARE EDUCATION/TRAINING PROGRAM
Payer: COMMERCIAL

## 2021-11-26 DIAGNOSIS — H91.92 HEARING LOSS OF LEFT EAR, UNSPECIFIED HEARING LOSS TYPE: ICD-10-CM

## 2021-11-26 DIAGNOSIS — Z82.2 FAMILY HISTORY OF HEARING LOSS: Primary | ICD-10-CM

## 2021-11-26 PROCEDURE — 92557 COMPREHENSIVE HEARING TEST: CPT | Performed by: AUDIOLOGIST

## 2021-11-26 PROCEDURE — 92550 TYMPANOMETRY & REFLEX THRESH: CPT | Performed by: AUDIOLOGIST

## 2021-11-26 NOTE — PROGRESS NOTES
AUDIOLOGY REPORT    SUBJECTIVE:  Kasia Macedo is a 56 year old female who was seen in Audiology at the [facility] for audiologic evaluation, referred by Blaire Paredes.  The patient reports possible left hearing loss as she notes hearing better when laying on her left side with her right-ear open compared to laying on right side with her left-ear open. The patient notes she does utilize earbuds frequently and does not note an asymmetry in hearing with use. The patient reports a sledding accident at 15 years of age resulting in a concussion but the patient denies changes in hearing, ear related issues or dizziness related to the incident. The patient reports her mother utilized binaural hearing amplification at an older age and that her father was suspected to have hearing loss. The patient denies other ear related issues, dizziness, history of chronic ear issues or ear surgeries, history of noise exposure or history of head trauma.    OBJECTIVE:  Fall Risk Screen:  1. Have you fallen two or more times in the past year? No  2. Have you fallen and had an injury in the past year? Fell off bike (no balance issues or dizziness), hand hurt but no medical intervention needed    Abuse Screening:  Do you feel unsafe at home or work/school? No  Do you feel threatened by someone? No  Does anyone try to keep you from having contact with others, or doing things outside of your home? No  Physical signs of abuse present? No    Otoscopic exam indicates ears are clear of cerumen bilaterally     Pure Tone Thresholds assessed using conventional audiometry with good  reliability from 250-8000 Hz bilaterally using circumaural headphones    RIGHT:  Thresholds within normal limits with exception of moderate (presumably sensorineural) hearing loss 8000 Hz    LEFT:    Thresholds within normal limits with exception of moderate (presumably sensorineural) hearing loss 8000  Hz    Tympanogram:    RIGHT: normal eardrum mobility    LEFT:   normal eardrum mobility    Reflexes (reported by stimulus ear):  RIGHT: Ipsilateral is present at normal levels  RIGHT: Contralateral is present at normal levels  LEFT:   Ipsilateral is present at normal levels  LEFT:   Contralateral is present at normal levels    Speech Reception Threshold:    RIGHT: 15 dB HL    LEFT:   10 dB HL  Word Recognition Score:     RIGHT: 100% at 55 dB HL using NU-6 recorded word list.    LEFT:   96% at 55 dB HL using NU-6 recorded word list.    ASSESSMENT:  Moderate hearing loss 8000 Hz bilaterally with normal middle ear status bilaterally. Today s results were discussed with the patient in detail.     PLAN:    It is recommended that the patient return for monitoring of hearing status bi-annually, sooner if changes in hearing or ear related issues are noted.  Please call this clinic with questions regarding these results or recommendations.      Julius Murray.  Licensed Audiologist  [place] #[id number]

## 2021-11-28 DIAGNOSIS — R31.29 MICROSCOPIC HEMATURIA: Primary | ICD-10-CM

## 2021-11-28 NOTE — RESULT ENCOUNTER NOTE
Please call patient to confirm she received this message:    Juan Carlos Pedroza,    Your creatinine is stable but there is some protein and blood in the urine. Can you please come back to the lab and repeat a urine sample with an additional test for protein? I think if those results are positive, we should have you revisit with nephrology.    Please schedule a lab only visit.    Blaire Paredes MD  [facility]  [phone]

## 2021-11-29 ENCOUNTER — TELEPHONE (OUTPATIENT)
Dept: FAMILY MEDICINE | Facility: CLINIC | Age: 56
End: 2021-11-29
Payer: COMMERCIAL

## 2021-11-29 DIAGNOSIS — E05.00 GRAVES DISEASE: Primary | ICD-10-CM

## 2021-11-29 LAB
BKR LAB AP GYN ADEQUACY: NORMAL
BKR LAB AP GYN INTERPRETATION: NORMAL
BKR LAB AP HPV REFLEX: NORMAL
BKR LAB AP PREVIOUS ABNORMAL: NORMAL
PATH REPORT.COMMENTS IMP SPEC: NORMAL
PATH REPORT.COMMENTS IMP SPEC: NORMAL
PATH REPORT.RELEVANT HX SPEC: NORMAL

## 2021-11-29 NOTE — TELEPHONE ENCOUNTER
PT called back.  She will have labs rechecked on 12/2/21.  PT also wanted to make sure the thyroid labs were checked annually- grave's disease concern.  Can Dr. Paredes order this added lab?    OK to leave a detailed message.  JORDON Wilkins

## 2021-11-29 NOTE — TELEPHONE ENCOUNTER
----- Message from Blaire Paredes MD sent at 11/28/2021  8:51 AM CST -----  Please call patient to confirm she received this message:    Juan Carlos Pedroza,    Your creatinine is stable but there is some protein and blood in the urine. Can you please come back to the lab and repeat a urine sample with an additional test for protein? I think if those results are positive, we should have you revisit with nephrology.    Please schedule a lab only visit.    Blaire Paredes MD  [facility]  [phone]

## 2021-11-29 NOTE — TELEPHONE ENCOUNTER
Left message on answering machine for patient to call clinic triage.  Please call us for this result message.  JORDON Wilkins

## 2021-11-30 NOTE — TELEPHONE ENCOUNTER
Left detailed message that requested labs have been added. Call if any additional questions.  María Pitts RN  [facility]

## 2021-12-01 LAB
HUMAN PAPILLOMA VIRUS 16 DNA: NEGATIVE
HUMAN PAPILLOMA VIRUS 18 DNA: NEGATIVE
HUMAN PAPILLOMA VIRUS FINAL DIAGNOSIS: NORMAL
HUMAN PAPILLOMA VIRUS OTHER HR: NEGATIVE

## 2021-12-06 ENCOUNTER — LAB (OUTPATIENT)
Dept: LAB | Facility: CLINIC | Age: 56
End: 2021-12-06
Payer: COMMERCIAL

## 2021-12-06 DIAGNOSIS — R31.29 MICROSCOPIC HEMATURIA: ICD-10-CM

## 2021-12-06 DIAGNOSIS — E05.00 GRAVES DISEASE: ICD-10-CM

## 2021-12-06 DIAGNOSIS — N02.9 THIN BASEMENT MEMBRANE DISEASE: ICD-10-CM

## 2021-12-06 DIAGNOSIS — Z11.4 SCREENING FOR HIV (HUMAN IMMUNODEFICIENCY VIRUS): ICD-10-CM

## 2021-12-06 DIAGNOSIS — N18.9 CHRONIC KIDNEY DISEASE, UNSPECIFIED CKD STAGE: ICD-10-CM

## 2021-12-06 LAB
ALBUMIN UR-MCNC: NEGATIVE MG/DL
APPEARANCE UR: CLEAR
BACTERIA #/AREA URNS HPF: ABNORMAL /HPF
BILIRUB UR QL STRIP: NEGATIVE
COLOR UR AUTO: YELLOW
GLUCOSE UR STRIP-MCNC: NEGATIVE MG/DL
HGB UR QL STRIP: ABNORMAL
KETONES UR STRIP-MCNC: NEGATIVE MG/DL
LEUKOCYTE ESTERASE UR QL STRIP: NEGATIVE
NITRATE UR QL: NEGATIVE
PH UR STRIP: 7 [PH] (ref 5–7)
RBC #/AREA URNS AUTO: ABNORMAL /HPF
SP GR UR STRIP: 1.02 (ref 1–1.03)
T3FREE SERPL-MCNC: 2.6 PG/ML (ref 2.3–4.2)
UROBILINOGEN UR STRIP-ACNC: 0.2 E.U./DL
WBC #/AREA URNS AUTO: ABNORMAL /HPF

## 2021-12-06 PROCEDURE — 84439 ASSAY OF FREE THYROXINE: CPT

## 2021-12-06 PROCEDURE — 82043 UR ALBUMIN QUANTITATIVE: CPT

## 2021-12-06 PROCEDURE — 84481 FREE ASSAY (FT-3): CPT

## 2021-12-06 PROCEDURE — 84443 ASSAY THYROID STIM HORMONE: CPT

## 2021-12-06 PROCEDURE — 81001 URINALYSIS AUTO W/SCOPE: CPT

## 2021-12-06 PROCEDURE — 36415 COLL VENOUS BLD VENIPUNCTURE: CPT

## 2021-12-06 PROCEDURE — 87389 HIV-1 AG W/HIV-1&-2 AB AG IA: CPT

## 2021-12-07 LAB
CREAT UR-MCNC: 55 MG/DL
HIV 1+2 AB+HIV1 P24 AG SERPL QL IA: NONREACTIVE
MICROALBUMIN UR-MCNC: 62 MG/L
MICROALBUMIN/CREAT UR: 112.73 MG/G CR (ref 0–25)
T4 FREE SERPL-MCNC: 0.91 NG/DL (ref 0.76–1.46)
TSH SERPL DL<=0.005 MIU/L-ACNC: 2.31 MU/L (ref 0.4–4)

## 2021-12-08 NOTE — RESULT ENCOUNTER NOTE
Juan Carlos Pedroza,    Thanks for completing your labs. Your thyroid testing is normal. Your urine tests are unchanged from before. Let's plan to recheck in one year to make sure things stay stable.     Hope you have a nice week.    Blaire Paredes MD  [facility]  [phone]

## 2022-01-03 NOTE — TELEPHONE ENCOUNTER
RECORDS RECEIVED FROM: Internal   REASON FOR VISIT: tremors   Date of Appt: 2/25/21   NOTES (FOR ALL VISITS) STATUS DETAILS   OFFICE NOTE from referring provider Internal Dr Obando @ [facility]:  1/21/21   OFFICE NOTE from other specialist N/A    DISCHARGE SUMMARY from hospital N/A    DISCHARGE REPORT from the ER N/A    OPERATIVE REPORT N/A    MEDICATION LIST Internal    IMAGING  (FOR ALL VISITS)     EMG N/A    EEG N/A    LUMBAR PUNCTURE N/A    JOE SCAN N/A    ULTRASOUND (CAROTID BILAT) *VASCULAR* N/A    MRI (HEAD, NECK, SPINE) N/A    CT (HEAD, NECK, SPINE) N/A

## 2022-01-26 PROBLEM — R25.1 TREMOR: Status: ACTIVE | Noted: 2022-01-26

## 2022-01-26 RX ORDER — L. ACIDOPHILUS/LACTOBAC SPOR 35MM-25MM
TABLET ORAL
COMMUNITY
End: 2022-02-05

## 2022-01-26 NOTE — PROGRESS NOTES
Diagnosis/Summary/Recommendations:    PATIENT: Kasia Macedo  56 year old female     : 1965    ILIR: 2022    MRN: [id number]      Dr Gisella Durand @[facility]    Tremor    Address   [address]   [facility] [address] Phone   [phone] (Home)   [phone] (Work)   [phone] (Mobile) *Preferred* E-mail Address   [email or web address]     Family partnership    Theo Lamb spouse  [phone]    Kizzy  Now Blairecheo Paredes is her pcp     Assessment:    (R25.1) Tremor  (primary encounter diagnosis)    TREMOR HAS BEEN present about 2 years  Right handed   Left hand tremor  No change in handwriting  No change in gait  Smell perception has not change  Has not noticed if alcohol helps her tremor  She denies family history of tremor  Has tremor primarily with holding things  No impact on her QOL  Stress can increase her tremor     Review of diagnosis    Tremor    Avoidance of dopamine blockers   none    Motor complication review   n/a    Review of Impulse control disorders   denies    Review of surgical or medication options   Reviewed     Gait/Balance/Falls   No recent falls    Exercise/Therapy performed/offered   Doing therapy  Walking   Bikes and runs when weather is gone    Cognitive/Driving   No problems    Mood   stable    Hallucinations/delusions   denies    Sleep   Sleeping okay  Goes to bed around 1030pm  Takes a while to fall asleep  Not snoring  May talk in her sleep  Sleeps with her   Wakes up 7am  Nocturia 1/noc    Bladder   Chronic renal disease  microscopic hematuria and has    GI/Constipation/GERD  Lactobacillus   No gi symptoms    ENDO   Lipid disorder  Graves disease/history of hyperthyroidism  Fish oil - 2 different products  Omega 3 fatty acids fish oil  Thyroid - euthyroid    Cardio/heart   Family history of heart disease  141/88 96    Vision   Wears glassaes    Heme   Not taking aspirin  Has a vessel pop in toe or finger - hurts for  15 minutes and then is gone    Other:    Sciatica  Low back pain  Right hip pain  Going to PT - has had had some left knee and hip work     Actinic ketosis        Medications            Fish oil lutein        Fish oil        Ibuprofen        Lactobacillus        MVI        Selenium 70mcg        Vitamin D 4000 IU                                      Plan:    Her examination shows primarily features of postural and action tremor more in the left than right hand. She does not have stiffness or slowness. This is most likely essential tremor.    Sleep issue - possibly RBD    Family history of neurological conditions (father and paternal uncle vs mother with alzheimer's disease    Discussion of the differential diagnosis of tremor, dopamine scanning, tremor devices, medications, surgery and genetics consultation with rahat benedict and sleep consultation with andrea carpio.       Coding statement:   Medical Decision Making:  #  Chronic progressive medical conditions addressed  - see above --   Review and/or interpretation of unique test or documentation from a provider outside of neurology thyroid   Independent historian provided additional details  no I  Prescription drug management and review of potential side effects and/or monitoring for side effects  -- see above ---  Health impacted by social determinants of health  no    I have reviewed the note as documented above.  This accurately captures the substance of my conversation with the patient and total time spent preparing for visit, executing visit and completing visit on the day of the visit:  60  minutes.  The portion of this total time included face to face time 840am - 930m     Daljit Burgos MD     ______________________________________    Last visit date and details:             ______________________________________      Patient was asked about 14 Review of systems including changes in vision (dry eyes, double vision), hearing, heart, lungs, musculoskeletal,  depression, anxiety, snoring, RBD, insomnia, urinary frequency, urinary urgency, constipation, swallowing problems, hematological, ID, allergies, skin problems: seborrhea, endocrinological: thyroid, diabetes, cholesterol; balance, weight changes, and other neurological problems and these were not significant at this time except for   Patient Active Problem List   Diagnosis     Low back pain     CARDIOVASCULAR SCREENING; LDL GOAL LESS THAN 160     Chronic kidney disease, unspecified     Sciatica     Right hip pain     AK (actinic keratosis)     Perimenopausal symptoms     Microscopic hematuria     Family history of heart disease     Graves disease     History of hyperthyroidism     Hypercholesterolemia     Tremor          Allergies   Allergen Reactions     Bee Venom      Shellfish Allergy      No past surgical history on file.  Past Medical History:   Diagnosis Date     CARDIOVASCULAR SCREENING; LDL GOAL LESS THAN 160 6/1/2011     Tremor 1/26/2022     Social History     Socioeconomic History     Marital status:      Spouse name: Not on file     Number of children: Not on file     Years of education: Not on file     Highest education level: Not on file   Occupational History     Occupation: [employer]     Employer: [employer]   Tobacco Use     Smoking status: Never Smoker     Smokeless tobacco: Never Used   Substance and Sexual Activity     Alcohol use: Yes     Comment: 1 drink a week     Drug use: No     Sexual activity: Yes     Partners: Male   Other Topics Concern     Parent/sibling w/ CABG, MI or angioplasty before 65F 55M? No   Social History Narrative     Not on file     Social Determinants of Health     Financial Resource Strain: Not on file   Food Insecurity: Not on file   Transportation Needs: Not on file   Physical Activity: Not on file   Stress: Not on file   Social Connections: Not on file   Intimate Partner Violence: Not on file   Housing Stability: Not on file       Drug and  lactation database from the [place] National Library of Medicine:  http://toxnet.nlm.nih.gov/cgi-bin/sis/htmlgen?LACT      B/P: Data Unavailable, T: Data Unavailable, P: Data Unavailable, R: Data Unavailable 0 lbs 0 oz  Last menstrual period 08/21/2013, not currently breastfeeding., There is no height or weight on file to calculate BMI.  Medications and Vitals not listed above were documented in the cart and reviewed by me.     Current Outpatient Medications   Medication Sig Dispense Refill     IBUPROFEN PO        Lactobacillus (ACIDOPHILUS PO) Take  by mouth. 2 tablets daily       Lactobacillus (ACIDOPHILUS/L-SPOROGENES) TABS Take by mouth.       Multiple Vitamin (MULTIVITAMIN ADULT PO) Take 1 tablet by mouth daily       Omega-3 Fatty Acids (FISH OIL PO) Take  by mouth. 2 capsules daily       Omega-3 Fatty Acids (SUPER OMEGA 3 EPA/DHA PO) Take 1 capsule by mouth daily       SELENIUM PO Take 1 tablet by mouth daily           Daljit Burgos MD

## 2022-02-05 RX ORDER — WITCH HAZEL 50 %
PADS, MEDICATED (EA) TOPICAL
COMMUNITY
Start: 2022-02-05

## 2022-02-25 ENCOUNTER — OFFICE VISIT (OUTPATIENT)
Dept: NEUROLOGY | Facility: CLINIC | Age: 57
End: 2022-02-25
Attending: FAMILY MEDICINE
Payer: COMMERCIAL

## 2022-02-25 ENCOUNTER — PRE VISIT (OUTPATIENT)
Dept: NEUROLOGY | Facility: CLINIC | Age: 57
End: 2022-02-25

## 2022-02-25 VITALS
SYSTOLIC BLOOD PRESSURE: 141 MMHG | DIASTOLIC BLOOD PRESSURE: 88 MMHG | WEIGHT: 137.8 LBS | BODY MASS INDEX: 24.41 KG/M2 | HEIGHT: 63 IN | HEART RATE: 96 BPM

## 2022-02-25 DIAGNOSIS — R25.1 TREMOR: Primary | ICD-10-CM

## 2022-02-25 DIAGNOSIS — Z82.0 FAMILY HISTORY OF NEUROLOGICAL DISORDER: ICD-10-CM

## 2022-02-25 DIAGNOSIS — G47.9 SLEEP DISORDER: ICD-10-CM

## 2022-02-25 PROCEDURE — 99205 OFFICE O/P NEW HI 60 MIN: CPT | Performed by: PSYCHIATRY & NEUROLOGY

## 2022-02-25 ASSESSMENT — PAIN SCALES - GENERAL: PAINLEVEL: NO PAIN (0)

## 2022-02-25 NOTE — LETTER
2022       RE: Kasia Macedo  [address]  [facility] [address]     Dear Colleague,    Thank you for referring your patient, Kasia Macedo, to the [facility] [place] [facility]. Please see a copy of my visit note below.      Diagnosis/Summary/Recommendations:    PATIENT: Kasia Macedo  56 year old female     : 1965    ILIR: 2022    MRN: [id number]      Dr Gisella Durand @[facility]    Tremor    Address   [address]   [facility] [address] Phone   [phone] (Home)   [phone] (Work)   [phone] (Mobile) *Preferred* E-mail Address   [email or web address]     Family partnership    Theo Lamb spouse  [phone]    Kizzy  Now Blaire Paredes is her pcp     Assessment:    (R25.1) Tremor  (primary encounter diagnosis)    TREMOR HAS BEEN present about 2 years  Right handed   Left hand tremor  No change in handwriting  No change in gait  Smell perception has not change  Has not noticed if alcohol helps her tremor  She denies family history of tremor  Has tremor primarily with holding things  No impact on her QOL  Stress can increase her tremor     Review of diagnosis    Tremor    Avoidance of dopamine blockers   none    Motor complication review   n/a    Review of Impulse control disorders   denies    Review of surgical or medication options   Reviewed     Gait/Balance/Falls   No recent falls    Exercise/Therapy performed/offered   Doing therapy  Walking   Bikes and runs when weather is gone    Cognitive/Driving   No problems    Mood   stable    Hallucinations/delusions   denies    Sleep   Sleeping okay  Goes to bed around 1030pm  Takes a while to fall asleep  Not snoring  May talk in her sleep  Sleeps with her   Wakes up 7am  Nocturia 1/noc    Bladder   Chronic renal disease  microscopic hematuria and has    GI/Constipation/GERD  Lactobacillus   No gi  symptoms    ENDO   Lipid disorder  Graves disease/history of hyperthyroidism  Fish oil - 2 different products  Omega 3 fatty acids fish oil  Thyroid - euthyroid    Cardio/heart   Family history of heart disease  141/88 96    Vision   Wears glassaes    Heme   Not taking aspirin  Has a vessel pop in toe or finger - hurts for 15 minutes and then is gone    Other:    Sciatica  Low back pain  Right hip pain  Going to PT - has had had some left knee and hip work     Actinic ketosis        Medications            Fish oil lutein        Fish oil        Ibuprofen        Lactobacillus        MVI        Selenium 70mcg        Vitamin D 4000 IU                                      Plan:    Her examination shows primarily features of postural and action tremor more in the left than right hand. She does not have stiffness or slowness. This is most likely essential tremor.    Sleep issue - possibly RBD    Family history of neurological conditions (father and paternal uncle vs mother with alzheimer's disease    Discussion of the differential diagnosis of tremor, dopamine scanning, tremor devices, medications, surgery and genetics consultation with rahat benedict and sleep consultation with andrea carpio.       Coding statement:   Medical Decision Making:  #  Chronic progressive medical conditions addressed  - see above --   Review and/or interpretation of unique test or documentation from a provider outside of neurology thyroid   Independent historian provided additional details  no I  Prescription drug management and review of potential side effects and/or monitoring for side effects  -- see above ---  Health impacted by social determinants of health  no    I have reviewed the note as documented above.  This accurately captures the substance of my conversation with the patient and total time spent preparing for visit, executing visit and completing visit on the day of the visit:  60  minutes.  The portion of this total time included face  to face time 840am - 930m     Daljit Burgos MD     ______________________________________    Last visit date and details:             ______________________________________      Patient was asked about 14 Review of systems including changes in vision (dry eyes, double vision), hearing, heart, lungs, musculoskeletal, depression, anxiety, snoring, RBD, insomnia, urinary frequency, urinary urgency, constipation, swallowing problems, hematological, ID, allergies, skin problems: seborrhea, endocrinological: thyroid, diabetes, cholesterol; balance, weight changes, and other neurological problems and these were not significant at this time except for   Patient Active Problem List   Diagnosis     Low back pain     CARDIOVASCULAR SCREENING; LDL GOAL LESS THAN 160     Chronic kidney disease, unspecified     Sciatica     Right hip pain     AK (actinic keratosis)     Perimenopausal symptoms     Microscopic hematuria     Family history of heart disease     Graves disease     History of hyperthyroidism     Hypercholesterolemia     Tremor          Allergies   Allergen Reactions     Bee Venom      Shellfish Allergy      No past surgical history on file.  Past Medical History:   Diagnosis Date     CARDIOVASCULAR SCREENING; LDL GOAL LESS THAN 160 6/1/2011     Tremor 1/26/2022     Social History     Socioeconomic History     Marital status:      Spouse name: Not on file     Number of children: Not on file     Years of education: Not on file     Highest education level: Not on file   Occupational History     Occupation: [employer]     Employer: [employer]   Tobacco Use     Smoking status: Never Smoker     Smokeless tobacco: Never Used   Substance and Sexual Activity     Alcohol use: Yes     Comment: 1 drink a week     Drug use: No     Sexual activity: Yes     Partners: Male   Other Topics Concern     Parent/sibling w/ CABG, MI or angioplasty before 65F 55M? No   Social History Narrative     Not on file     Social  Determinants of Health     Financial Resource Strain: Not on file   Food Insecurity: Not on file   Transportation Needs: Not on file   Physical Activity: Not on file   Stress: Not on file   Social Connections: Not on file   Intimate Partner Violence: Not on file   Housing Stability: Not on file       Drug and lactation database from the [place] National Library of Medicine:  http://toxnet.nlm.nih.gov/cgi-bin/sis/htmlgen?LACT      B/P: Data Unavailable, T: Data Unavailable, P: Data Unavailable, R: Data Unavailable 0 lbs 0 oz  Last menstrual period 08/21/2013, not currently breastfeeding., There is no height or weight on file to calculate BMI.  Medications and Vitals not listed above were documented in the cart and reviewed by me.     Current Outpatient Medications   Medication Sig Dispense Refill     IBUPROFEN PO        Lactobacillus (ACIDOPHILUS PO) Take  by mouth. 2 tablets daily       Lactobacillus (ACIDOPHILUS/L-SPOROGENES) TABS Take by mouth.       Multiple Vitamin (MULTIVITAMIN ADULT PO) Take 1 tablet by mouth daily       Omega-3 Fatty Acids (FISH OIL PO) Take  by mouth. 2 capsules daily       Omega-3 Fatty Acids (SUPER OMEGA 3 EPA/DHA PO) Take 1 capsule by mouth daily       SELENIUM PO Take 1 tablet by mouth daily           Daljit Burgos MD

## 2022-02-25 NOTE — PATIENT INSTRUCTIONS
Assessment:    (R25.1) Tremor  (primary encounter diagnosis)    TREMOR HAS BEEN present about 2 years  Right handed   Left hand tremor  No change in handwriting  No change in gait  Smell perception has not change  Has not noticed if alcohol helps her tremor  She denies family history of tremor  Has tremor primarily with holding things  No impact on her QOL  Stress can increase her tremor     Review of diagnosis    Tremor    Avoidance of dopamine blockers   none    Motor complication review   n/a    Review of Impulse control disorders   denies    Review of surgical or medication options   Reviewed     Gait/Balance/Falls   No recent falls    Exercise/Therapy performed/offered   Doing therapy  Walking   Bikes and runs when weather is gone    Cognitive/Driving   No problems    Mood   stable    Hallucinations/delusions   denies    Sleep   Sleeping okay  Goes to bed around 1030pm  Takes a while to fall asleep  Not snoring  May talk in her sleep  Sleeps with her   Wakes up 7am  Nocturia 1/noc    Bladder   Chronic renal disease  microscopic hematuria and has    GI/Constipation/GERD  Lactobacillus   No gi symptoms    ENDO   Lipid disorder  Graves disease/history of hyperthyroidism  Fish oil - 2 different products  Omega 3 fatty acids fish oil  Thyroid - euthyroid    Cardio/heart   Family history of heart disease  141/88 96    Vision   Wears glassaes    Heme   Not taking aspirin  Has a vessel pop in toe or finger - hurts for 15 minutes and then is gone    Other:    Sciatica  Low back pain  Right hip pain  Going to PT - has had had some left knee and hip work     Actinic ketosis        Medications            Fish oil lutein        Fish oil        Ibuprofen        Lactobacillus        MVI        Selenium 70mcg        Vitamin D 4000 IU                                      Plan:    Her examination shows primarily features of postural and action tremor more in the left than right hand. She does not have stiffness or  slowness. This is most likely essential tremor.    Sleep issue - possibly RBD    Family history of neurological conditions (father and paternal uncle vs mother with alzheimer's disease    Discussion of the differential diagnosis of tremor, dopamine scanning, tremor devices, medications, surgery and genetics consultation with rahat benedict and sleep consultation with andrea carpio.

## 2022-02-28 ENCOUNTER — TELEPHONE (OUTPATIENT)
Dept: LAB | Facility: CLINIC | Age: 57
End: 2022-02-28
Payer: COMMERCIAL

## 2022-02-28 NOTE — PROGRESS NOTES
GENETIC COUNSELING-neurology  I left a message for Kasia at the request of Dr. Burgos. If she is interested in pursuing genetic testing, I asked her to call me back to schedule and offered several possible times.    Nate Simental MS, [facility]  Certified Genetic Counselor

## 2022-04-11 ENCOUNTER — MYC MEDICAL ADVICE (OUTPATIENT)
Dept: FAMILY MEDICINE | Facility: CLINIC | Age: 57
End: 2022-04-11
Payer: COMMERCIAL

## 2022-04-12 ENCOUNTER — OFFICE VISIT (OUTPATIENT)
Dept: FAMILY MEDICINE | Facility: CLINIC | Age: 57
End: 2022-04-12
Payer: COMMERCIAL

## 2022-04-12 VITALS
HEART RATE: 76 BPM | WEIGHT: 131 LBS | DIASTOLIC BLOOD PRESSURE: 80 MMHG | SYSTOLIC BLOOD PRESSURE: 120 MMHG | TEMPERATURE: 98.1 F | RESPIRATION RATE: 16 BRPM | BODY MASS INDEX: 23.39 KG/M2

## 2022-04-12 DIAGNOSIS — E06.3 HASHIMOTO'S THYROIDITIS: ICD-10-CM

## 2022-04-12 DIAGNOSIS — R00.2 HEART PALPITATIONS: Primary | ICD-10-CM

## 2022-04-12 LAB
ANION GAP SERPL CALCULATED.3IONS-SCNC: 13 MMOL/L (ref 5–18)
BUN SERPL-MCNC: 11 MG/DL (ref 8–22)
CALCIUM SERPL-MCNC: 10.2 MG/DL (ref 8.5–10.5)
CHLORIDE BLD-SCNC: 104 MMOL/L (ref 98–107)
CO2 SERPL-SCNC: 23 MMOL/L (ref 22–31)
CREAT SERPL-MCNC: 0.64 MG/DL (ref 0.6–1.1)
GFR SERPL CREATININE-BSD FRML MDRD: >90 ML/MIN/1.73M2
GLUCOSE BLD-MCNC: 105 MG/DL (ref 70–125)
POTASSIUM BLD-SCNC: 4.9 MMOL/L (ref 3.5–5)
SODIUM SERPL-SCNC: 140 MMOL/L (ref 136–145)
T3 SERPL-MCNC: 101 NG/DL (ref 60–181)
T4 FREE SERPL-MCNC: 1.06 NG/DL (ref 0.7–1.8)
TSH SERPL DL<=0.005 MIU/L-ACNC: 1.76 UIU/ML (ref 0.3–5)

## 2022-04-12 PROCEDURE — 80048 BASIC METABOLIC PNL TOTAL CA: CPT | Performed by: FAMILY MEDICINE

## 2022-04-12 PROCEDURE — 99213 OFFICE O/P EST LOW 20 MIN: CPT | Performed by: FAMILY MEDICINE

## 2022-04-12 PROCEDURE — 36415 COLL VENOUS BLD VENIPUNCTURE: CPT | Performed by: FAMILY MEDICINE

## 2022-04-12 PROCEDURE — 84480 ASSAY TRIIODOTHYRONINE (T3): CPT | Performed by: FAMILY MEDICINE

## 2022-04-12 PROCEDURE — 84443 ASSAY THYROID STIM HORMONE: CPT | Performed by: FAMILY MEDICINE

## 2022-04-12 PROCEDURE — 84439 ASSAY OF FREE THYROXINE: CPT | Performed by: FAMILY MEDICINE

## 2022-04-12 NOTE — PROGRESS NOTES
OFFICE VISIT - FAMILY MEDICINE     ASSESSMENT AND PLAN       ICD-10-CM    1. Heart palpitations  R00.2 Basic metabolic panel  (Ca, Cl, CO2, Creat, Gluc, K, Na, BUN)     Basic metabolic panel  (Ca, Cl, CO2, Creat, Gluc, K, Na, BUN)   2. Hashimoto's thyroiditis  E06.3 TSH     T4, free     T3, total     TSH     T4, free     T3, total   Heart palpitation at home mostly while  going upstairs, exam today was benign, discussed possible differential diagnosis, plan is to do  lab works included thyroid for Hashimoto thyroiditis,discussed  stress management, she will let us know if symptoms persist over time, will order a Holter monitor or have her follow-up with a cardiologist.    CHIEF COMPLAINT   Heart Problem (Racing heart concerns, hypothyroid, feels more shaky in the morning)       HPI   Kasia Macedo is a 57 year old female.  No Patient Care Coordination Note on file.    She is here today with heart racing when she goes upstairs, no chest pain, no shortness of breath, no dizziness.  Also acknowledges a lot of stress recently, with anxiety.  Was seen by neurologist and diagnosed with essential tremor, was prescribed propanolol, has not started yet.  She also has Hashimoto thyroiditis, last TSH was done about 4-5 months ago and was within normal range she is concerned that she may be developing hypo or hyper thyroidism.  She had a negative coronary calcium score less than 5 years ago.      Review of Systems As per HPI, otherwise negative.    OBJECTIVE   /80 (BP Location: Left arm, Patient Position: Sitting, Cuff Size: Adult Regular)   Pulse 76   Temp 98.1  F (36.7  C) (Temporal)   Resp 16   Wt 59.4 kg (131 lb)   LMP 08/21/2013   BMI 23.39 kg/m    Physical Exam  Constitutional:       Appearance: Normal appearance.   HENT:      Head: Normocephalic and atraumatic.   Cardiovascular:      Rate and Rhythm: Normal rate and regular rhythm.      Heart sounds: No murmur heard.  Pulmonary:      Effort: Pulmonary  effort is normal.      Breath sounds: Normal breath sounds.   Musculoskeletal:         General: No swelling.      Cervical back: Normal range of motion.   Neurological:      General: No focal deficit present.      Mental Status: She is alert.   Psychiatric:         Behavior: Behavior normal.         Thought Content: Thought content normal.         Judgment: Judgment normal.         PFSH     Family History   Problem Relation Age of Onset     Eye Disorder Mother         glaucoma     Cardiovascular Mother         angina     Alzheimer Disease Mother      Dementia Mother      Glaucoma Mother      Skin Cancer Mother      Neurologic Disorder Mother         tremor     Hypertension Father      Dementia Father         cognitive and speech changes     Skin Cancer Father      Other - See Comments Father         ?progressive aphasia     Eye Disorder Sister         detached retina     Other - See Comments Sister      Eye Disorder Sister         glaucoma, suspect     Thyroid Disease Sister      Other - See Comments Sister      Other - See Comments Sister      Other - See Comments Brother      Retinal detachment Brother      Other - See Comments Brother      Other - See Comments Brother      Alcoholism Brother      Alcoholism Brother      Blood Disease Maternal Grandmother         lukemia     Eye Disorder Maternal Grandfather         glaucoma     Cerebrovascular Disease Maternal Grandfather      Neurologic Disorder Paternal Grandmother      Other - See Comments Daughter         lives in [place] - Masters degree in education     Dementia Paternal Uncle         like his brother; her father     Other - See Comments Natural child         l[facility] in [facility]     Neurologic Disorder Other      Social History     Socioeconomic History     Marital status:      Spouse name: Not on file     Number of children: Not on file     Years of education: Not on file     Highest education level: Not on file   Occupational History     Occupation:  [employer]     Employer: [employer]   Tobacco Use     Smoking status: Never Smoker     Smokeless tobacco: Never Used   Substance and Sexual Activity     Alcohol use: Yes     Comment: 1 drink a week     Drug use: No     Sexual activity: Yes     Partners: Male   Other Topics Concern     Parent/sibling w/ CABG, MI or angioplasty before 65F 55M? 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         2 kids - non binary and female     Social Determinants of Health     Financial Resource Strain: Not on file   Food Insecurity: Not on file   Transportation Needs: Not on file   Physical Activity: Not on file   Stress: Not on file   Social Connections: Not on file   Intimate Partner Violence: Not on file   Housing Stability: Not on file       PMSH   [unfilled]  Past Surgical History:   Procedure Laterality Date     COLONOSCOPY       OTHER SURGICAL HISTORY      precancer skin lesion       RESULTS/CONSULTS (Lab/Rad)     Recent Results (from the past 168 hour(s))   TSH   Result Value Ref Range    TSH 1.76 0.30 - 5.00 uIU/mL   T4, free   Result Value Ref Range    Free T4 1.06 0.70 - 1.80 ng/dL   T3, total   Result Value Ref Range    T3 Total 101 60 - 181 ng/dL   Basic metabolic panel  (Ca, Cl, CO2, Creat, Gluc, K, Na, BUN)   Result Value Ref Range    Sodium 140 136 - 145 mmol/L    Potassium 4.9 3.5 - 5.0 mmol/L    Chloride 104 98 - 107 mmol/L    Carbon Dioxide (CO2) 23 22 - 31 mmol/L    Anion Gap 13 5 - 18 mmol/L    Urea Nitrogen 11 8 - 22 mg/dL    Creatinine 0.64 0.60 - 1.10 mg/dL    Calcium 10.2 8.5 - 10.5 mg/dL    Glucose 105 70 - 125 mg/dL    GFR Estimate >90 >60 mL/min/1.73m2        [unfilled]    HEALTH MAINTENANCE / SCREENING   [unfilled], [unfilled],[unfilled]  Immunization History   Administered Date(s) Administered     COVID-19,PF,Pfizer (12+ Yrs) 01/29/2021, 02/19/2021, 10/14/2021     Influenza  Quad, Recombinant, pf(RIV4) (Flublok) 11/24/2021     Influenza,INJ,MDCK,PF,Quad >4yrs 10/24/2020     TD (ADULT, 7+) 05/15/2015     Td (Adult), Adsorbed 08/10/2005     Tdap (Adacel,Boostrix) 02/10/2020     Tdap (Adult) Unspecified Formulation 08/10/2005, 02/10/2020     Health Maintenance   Topic     ZOSTER IMMUNIZATION (1 of 2)     PREVENTIVE CARE VISIT      BMP      LIPID      ANNUAL REVIEW OF HM ORDERS      MAMMO SCREENING      HPV TEST      ADVANCE CARE PLANNING      PAP      COLORECTAL CANCER SCREENING      DTAP/TDAP/TD IMMUNIZATION (4 - Td or Tdap)     HEPATITIS C SCREENING      HIV SCREENING      PHQ-2 (once per calendar year)      INFLUENZA VACCINE      COVID-19 Vaccine      Pneumococcal Vaccine: Pediatrics (0 to 5 Years) and At-Risk Patients (6 to 64 Years)      IPV IMMUNIZATION      MENINGITIS IMMUNIZATION      HEPATITIS B IMMUNIZATION      Review of external notes as documented elsewhere in note  25 minutes spent on the date of the encounter doing chart review, review of outside records, review of test results, interpretation of tests, patient visit, documentation, discussion with other provider(s) and discussion with family          Monserrat Key MD  [facility]   This transcription uses voice recognition software, which may contain typographical errors.  Answers for HPI/ROS submitted by the patient on 4/12/2022  How many servings of fruits and vegetables do you eat daily?: 4 or more  On average, how many sweetened beverages do you drink each day (Examples: soda, juice, sweet tea, etc.  Do NOT count diet or artificially sweetened beverages)?: 1  How many minutes a day do you exercise enough to make your heart beat faster?: 20 to 29  How many days a week do you exercise enough to make your heart beat faster?: 6  How many days per week do you miss taking your medication?: 3  What makes it hard for you to take your medication every day?: remembering to take  What  is the reason for your visit today?: Some recent racing heart symptoms, low blood sugar  When did your symptoms begin?: 1-2 weeks ago  What are your symptoms?: racing heart, weakness  How would you describe these symptoms?: Moderate  Are your symptoms:: Worsening  Have you had these symptoms before?: No  Is there anything that makes you feel worse?: Movement, stress  Is there anything that makes you feel better?: Not sure

## 2022-04-12 NOTE — TELEPHONE ENCOUNTER
My Chart response sent to patient.  I would recommend that she be seen to have her symptoms evaluated and suspect she will need some labs if she believes it is related to blood sugar.  María Pitts RN  [facility]

## 2022-04-13 NOTE — TELEPHONE ENCOUNTER
"I agree. It looks like she was seen by Dr. Key yesterday. Note is unfinished so I\"m not sure what the plan was. She should follow up if needed.    Blaire Paredes MD  [facility]  [phone]    "

## 2022-04-13 NOTE — TELEPHONE ENCOUNTER
Writer responded via [employer].    MIGDALIA BrooksN, RN  [facility]

## 2022-06-16 ENCOUNTER — TRANSFERRED RECORDS (OUTPATIENT)
Dept: HEALTH INFORMATION MANAGEMENT | Facility: CLINIC | Age: 57
End: 2022-06-16

## 2022-07-22 ENCOUNTER — TELEPHONE (OUTPATIENT)
Dept: FAMILY MEDICINE | Facility: CLINIC | Age: 57
End: 2022-07-22

## 2022-07-22 ENCOUNTER — VIRTUAL VISIT (OUTPATIENT)
Dept: FAMILY MEDICINE | Facility: CLINIC | Age: 57
End: 2022-07-22
Payer: COMMERCIAL

## 2022-07-22 DIAGNOSIS — U07.1 INFECTION DUE TO 2019 NOVEL CORONAVIRUS: Primary | ICD-10-CM

## 2022-07-22 PROCEDURE — 99213 OFFICE O/P EST LOW 20 MIN: CPT | Mod: 95 | Performed by: NURSE PRACTITIONER

## 2022-07-22 NOTE — PROGRESS NOTES
"Kasia is a 57 year old who is being evaluated via a billable video visit.      How would you like to obtain your AVS? MyChart  If the video visit is dropped, the invitation should be resent by: Text to cell phone: see demographic  Will anyone else be joining your video visit? No         Assessment & Plan     Kasia was seen today for covid treatment.    Diagnoses and all orders for this visit:    Infection due to 2019 novel coronavirus  COVID-19 positive patient.  Encounter for consideration of medication intervention.   Patient does not qualify for a prescription. Full discussion with patient including medication options, risks and benefits. Potential drug interactions reviewed with patient.     Treatment Planned symptomatic treatment    Temporary change to home medications:     Estimated body mass index is 23.39 kg/m  as calculated from the following:    Height as of 2/25/22: 1.594 m (5' 2.75\").    Weight as of 4/12/22: 59.4 kg (131 lb).  GFR Estimate   Date Value Ref Range Status   04/12/2022 >90 >60 mL/min/1.73m2 Final     Comment:     Effective December 21, 2021 eGFRcr in adults is calculated using the 2021 CKD-EPI creatinine equation which includes age and gender (Maribell et al., NEJM, DOI: 10.1056/AKZZni0310728)   06/24/2020 >90 >60 mL/min/[1.73_m2] Final     Comment:     Non  GFR Calc  Starting 12/18/2018, serum creatinine based estimated GFR (eGFR) will be   calculated using the Chronic Kidney Disease Epidemiology Collaboration   (CKD-EPI) equation.       No results found for: [id number]    No follow-ups on file.    ANTOINETTE [employer]ess [employer]    57 year old  year old female with PMH   Patient Active Problem List   Diagnosis Code     Low back pain M54.50     CARDIOVASCULAR SCREENING; LDL GOAL LESS THAN 160 Z13.6     Chronic kidney disease, unspecified N18.9     Sciatica M54.30     Right hip pain M25.551     AK (actinic keratosis) L57.0     " Perimenopausal symptoms N95.1     Microscopic hematuria R31.29     Family history of heart disease Z82.49     Graves disease E05.00     History of hyperthyroidism Z86.39     Hypercholesterolemia E78.00     Tremor R25.1     in clinic for acute office visit related to/establish care/to discuss       Subjective   Kasia is a 57 year old presenting for the following health issues: No chief complaint on file.      HPI   Patient reports symptoms of cough, fever, chills, malaise which started last night; and tested positive this morning.  Denies shortness of breath, wheezing or congestion.           Review of Systems   Negative except HPI      Objective       Vitals:  No vitals were obtained today due to virtual visit.    Physical Exam   GENERAL: Healthy, alert and no distress  EYES: Eyes grossly normal to inspection.  No discharge or erythema, or obvious scleral/conjunctival abnormalities.  RESP: No audible wheeze, cough, or visible cyanosis.  No visible retractions or increased work of breathing.    SKIN: Visible skin clear. No significant rash, abnormal pigmentation or lesions.  NEURO: Cranial nerves grossly intact.  Mentation and speech appropriate for age.  PSYCH: Mentation appears normal, affect normal/bright, judgement and insight intact, normal speech and appearance well-groomed.                Video-Visit Details    Video Start Time: 1230    Type of service:  Video Visit    Video End Time:1245    Originating Location (pt. Location): Home    Distant Location (provider location):  [facility]     Platform used for Video Visit: [employer]  ..

## 2022-07-22 NOTE — PATIENT INSTRUCTIONS

## 2022-07-22 NOTE — TELEPHONE ENCOUNTER
Symptoms began last night and tested positive for COVID this morning.  María Pitts RN  [facility]

## 2022-10-16 ENCOUNTER — HEALTH MAINTENANCE LETTER (OUTPATIENT)
Age: 57
End: 2022-10-16

## 2022-11-01 ASSESSMENT — SLEEP AND FATIGUE QUESTIONNAIRES
HOW LIKELY ARE YOU TO NOD OFF OR FALL ASLEEP WHILE LYING DOWN TO REST IN THE AFTERNOON WHEN CIRCUMSTANCES PERMIT: MODERATE CHANCE OF DOZING
HOW LIKELY ARE YOU TO NOD OFF OR FALL ASLEEP WHILE SITTING AND TALKING TO SOMEONE: WOULD NEVER DOZE
HOW LIKELY ARE YOU TO NOD OFF OR FALL ASLEEP IN A CAR, WHILE STOPPED FOR A FEW MINUTES IN TRAFFIC: WOULD NEVER DOZE
HOW LIKELY ARE YOU TO NOD OFF OR FALL ASLEEP WHILE SITTING AND READING: SLIGHT CHANCE OF DOZING
HOW LIKELY ARE YOU TO NOD OFF OR FALL ASLEEP WHILE SITTING INACTIVE IN A PUBLIC PLACE: WOULD NEVER DOZE
HOW LIKELY ARE YOU TO NOD OFF OR FALL ASLEEP WHILE SITTING QUIETLY AFTER LUNCH WITHOUT ALCOHOL: WOULD NEVER DOZE
HOW LIKELY ARE YOU TO NOD OFF OR FALL ASLEEP WHILE WATCHING TV: WOULD NEVER DOZE
HOW LIKELY ARE YOU TO NOD OFF OR FALL ASLEEP WHEN YOU ARE A PASSENGER IN A CAR FOR AN HOUR WITHOUT A BREAK: SLIGHT CHANCE OF DOZING

## 2022-11-02 ENCOUNTER — VIRTUAL VISIT (OUTPATIENT)
Dept: SLEEP MEDICINE | Facility: CLINIC | Age: 57
End: 2022-11-02
Attending: PSYCHIATRY & NEUROLOGY
Payer: COMMERCIAL

## 2022-11-02 VITALS — BODY MASS INDEX: 23.04 KG/M2 | WEIGHT: 130 LBS | HEIGHT: 63 IN

## 2022-11-02 DIAGNOSIS — G47.8 SLEEP TALKING: Primary | ICD-10-CM

## 2022-11-02 PROCEDURE — 99243 OFF/OP CNSLTJ NEW/EST LOW 30: CPT | Mod: 95 | Performed by: PSYCHIATRY & NEUROLOGY

## 2022-11-02 NOTE — PROGRESS NOTES
Kasia is a 57 year old who is being evaluated via a billable video visit.       Video-Visit Details    Video Start Time: 0733    Type of service:  Video Visit    Video End Time:0755    Originating Location (pt. Location): Home    Distant Location (provider location):  On-site    Platform used for Video Visit: [employer]    Brief sleep staff note    Referral from Dr Burgos to evaluate for the possibility of REM sleep behavior disorder.     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     She is a good sleeper.  Goes to bed about 2215, wakes up at 0700 and maybe has one brief awakening to go to the bathroom.  Feels refreshed and alert during the day. No significant snoring, witnessed apneas, or HTN.  Denies daytime sleepiness. Denies trouble driving.     She does sleep talk but this has been long standing since childhood, usually in the first half of the night.  Without dream enactment.  No sleep related injury.      She has a tremor that is being evaluate by Dr Burgos.  But no stated difficulty with smell, constipation, orthostasis.     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  Discussed spectrum of parasomnias and at this point do not have a high suspicion of RBD.  This combined with normal smell, no constipation and no difficulty with orthostasis puts her at lower risk for neurodegeneration.  Will continue to follow up with Dr Burgos.     Would not advise a PSG at this point.  I asked her to mychart me if she starts acting out her dreams and we will set up a PSG.     She understands.      All questions were answered.    It is a great privilege being asked to participate in this patients care.  The patient has been advised on the importance in of never operating operating a motor vehicle while tired or sleepy.        I visited with the patient directly but also extensively reviewed chart and coordinated care. Total time spent  in the care of this patient today (Face-to-Face time + chart time, , and coordination of care) was 45 minutes.

## 2023-01-09 ENCOUNTER — ANCILLARY PROCEDURE (OUTPATIENT)
Dept: MAMMOGRAPHY | Facility: CLINIC | Age: 58
End: 2023-01-09
Attending: STUDENT IN AN ORGANIZED HEALTH CARE EDUCATION/TRAINING PROGRAM
Payer: COMMERCIAL

## 2023-01-09 DIAGNOSIS — Z12.31 VISIT FOR SCREENING MAMMOGRAM: ICD-10-CM

## 2023-01-09 PROCEDURE — 77063 BREAST TOMOSYNTHESIS BI: CPT | Mod: 26

## 2023-01-09 PROCEDURE — 77067 SCR MAMMO BI INCL CAD: CPT | Mod: 26

## 2023-01-09 PROCEDURE — 77067 SCR MAMMO BI INCL CAD: CPT

## 2023-03-10 ENCOUNTER — HOSPITAL ENCOUNTER (OUTPATIENT)
Dept: PHYSICAL THERAPY | Facility: CLINIC | Age: 58
Discharge: HOME | End: 2023-03-10
Attending: INTERNAL MEDICINE
Payer: COMMERCIAL

## 2023-03-10 ENCOUNTER — ANCILLARY PROCEDURE (OUTPATIENT)
Dept: GENERAL RADIOLOGY | Facility: CLINIC | Age: 58
End: 2023-03-10
Attending: INTERNAL MEDICINE
Payer: COMMERCIAL

## 2023-03-10 ENCOUNTER — OFFICE VISIT (OUTPATIENT)
Dept: FAMILY MEDICINE | Facility: CLINIC | Age: 58
End: 2023-03-10
Payer: COMMERCIAL

## 2023-03-10 VITALS
SYSTOLIC BLOOD PRESSURE: 121 MMHG | WEIGHT: 135 LBS | RESPIRATION RATE: 16 BRPM | DIASTOLIC BLOOD PRESSURE: 77 MMHG | TEMPERATURE: 98 F | BODY MASS INDEX: 23.92 KG/M2 | HEART RATE: 75 BPM | OXYGEN SATURATION: 99 % | HEIGHT: 63 IN

## 2023-03-10 DIAGNOSIS — Z91.013 SEAFOOD ALLERGY: ICD-10-CM

## 2023-03-10 DIAGNOSIS — H81.10 BENIGN PAROXYSMAL POSITIONAL VERTIGO, UNSPECIFIED LATERALITY: ICD-10-CM

## 2023-03-10 DIAGNOSIS — M79.671 FOOT PAIN, RIGHT: ICD-10-CM

## 2023-03-10 DIAGNOSIS — Z91.013 SHELLFISH ALLERGY: ICD-10-CM

## 2023-03-10 DIAGNOSIS — H81.10 BENIGN PAROXYSMAL POSITIONAL VERTIGO, UNSPECIFIED LATERALITY: Primary | ICD-10-CM

## 2023-03-10 PROCEDURE — 95992 CANALITH REPOSITIONING PROC: CPT | Mod: GP | Performed by: PHYSICAL THERAPIST

## 2023-03-10 PROCEDURE — 99214 OFFICE O/P EST MOD 30 MIN: CPT | Performed by: INTERNAL MEDICINE

## 2023-03-10 PROCEDURE — 97161 PT EVAL LOW COMPLEX 20 MIN: CPT | Mod: GP | Performed by: PHYSICAL THERAPIST

## 2023-03-10 PROCEDURE — 73630 X-RAY EXAM OF FOOT: CPT | Mod: TC | Performed by: RADIOLOGY

## 2023-03-10 ASSESSMENT — PAIN SCALES - GENERAL: PAINLEVEL: SEVERE PAIN (6)

## 2023-03-10 NOTE — PROGRESS NOTES
03/10/23 1600   Quick Adds   Quick Adds Vestibular Eval   Type of Visit Initial OP PT Evaluation   General Information   Start of Care Date 03/10/23   Referring Physician Sandy Hdz MD   Orders Evaluate and Treat as Indicated   Order Date 03/10/23   Medical Diagnosis Benign paroxysmal positional vertigo, unspecified laterality   Onset of illness/injury or Date of Surgery 03/10/23   Surgical/Medical history reviewed Yes   Pertinent history of current problem (include personal factors and/or comorbidities that impact the POC) Kasia presents to OP PT with acute onset vertigo. States that she first noticed when she woke up this morning. Reports spinning dizziness lasting under 30 seconds with bed mobility. Initiially had nausea as well. Attempted 2 epley maneuver's at home ( R and L). States that her dizziness and nausea have increased as the day has gone on. HIstory of BPPV >5 years ago.   Living environment [facility]   Patient/Family Goals Statement Have dizziness assessed   Fall Risk Screen   Fall screen completed by PT   Have you fallen 2 or more times in the past year? No   Have you fallen and had an injury in the past year? No   Functional Scales   Functional Scales and Outcomes DHI: 48/100   Cognitive Status Examination   Orientation orientation to person, place and time   Strength   Strength Comments Not formally assessed but demonstrates functional strength with transfers   Bed Mobility   Bed Mobility Comments Independent   Transfer Skills   Transfer Comments Independent   Gait   Gait Comments Independent, no sings of instability   Balance   Balance Comments No signs of instability throughout session   Sensory Examination   Sensory Perception no deficits were identified   Cervicogenic Screen   Neck ROM WFLs   Oculomotor Exam   Smooth Pursuit Normal   Saccades Normal   VOR Normal   VOR Cancellation Normal   Rapid Head Thrust Normal   Infrared Goggle Exam or Frenzel Lense Exam   Vestibular  Suppressant in Last 24 Hours? No   Exam completed with Infrared Goggles   Spontaneous Nystagmus Negative   Gaze Evoked Nystagmus Negative   [place]-Hallpike (right) Upbeating R torsional  (lasting <5 sec, very mild, mild increase in symtpoms)   [place]-Hallpike (Left) Negative   HSCC Supine Roll Test (Right) Horizontal R   HSCC Supine Roll Test (Right) Comments very mild, occasional beats, <30 sec   HSCC Supine Roll Test (Left) Horizontal L   HSCC Supine Roll Test (Left) Comments  mild but slightly more symptomatic than R side, <30 sec   BPPV Canal(s) R Posterior;L Horizontal   BPPV Type Canalithasis   Planned Therapy Interventions   Planned Therapy Interventions balance training;neuromuscular re-education  (CRM)   Clinical Impression   Criteria for Skilled Therapeutic Interventions Met yes, treatment indicated   PT Diagnosis Dizziness impairing bed mobility   Influenced by the following impairments dizziness, nausea   Functional limitations due to impairments difficulty with bed mobility and bending tasks   Clinical Presentation Stable/Uncomplicated   Clinical Presentation Rationale improving from initial onset   Clinical Decision Making (Complexity) Low complexity   Therapy Frequency 1 time/week   Predicted Duration of Therapy Intervention (days/wks) 30 days   Risk & Benefits of therapy have been explained Yes   Patient, Family & other staff in agreement with plan of care Yes   Education Assessment   Preferred Learning Style Listening;Demonstration   Barriers to Learning No barriers   GOALS   PT Eval Goals 1;2   Goal 1   Goal Identifier DHI   Goal Description The pt will score <25/100 on the DHI indicating a reduction in subjetive dizziness and improvement in QOL   Goal Progress baseline: 48/100   Target Date 04/09/23   Goal 2   Goal Identifier Positionals   Goal Description The pt will test negative with positional testing indicating a return to PLOF and decrease nausea with bed mobility   Target Date 04/09/23   Total  Evaluation Time   PT Eval, Low Complexity Minutes (89507) 20

## 2023-03-10 NOTE — PROGRESS NOTES
Aria Pedroza is a 57 year old presenting for the following health issues:  Dizziness (More so with movement ), Foot Pain (Right foot old injury/), and Referral (Allergist )      History of Present Illness       Reason for visit:  Dizziness; also right foot pain  Symptom onset:  Today  Symptoms include:  I'm dizzy when vertical  Symptom intensity:  Moderate  Symptom progression:  Improving  Had these symptoms before:  Yes  Has tried/received treatment for these symptoms:  Yes  What makes it worse:  Epley's maneuver  What makes it better:  Resting, laying down    She eats 4 or more servings of fruits and vegetables daily.She consumes 0 sweetened beverage(s) daily.She exercises with enough effort to increase her heart rate 20 to 29 minutes per day.  She exercises with enough effort to increase her heart rate 5 days per week.      Current Medications:     Current Outpatient Medications   Medication Sig Dispense Refill     FISH OIL-LUTEIN-D-ZEAXANTHIN PO Different fish oil product by mouth daily       fish oil-omega-3 fatty acids 1000 MG capsule capsule       IBUPROFEN PO As needed       Lactobacillus (ACIDOPHILUS) TABS daily       Multiple Vitamin (MULTIVITAMIN ADULT PO) Take 1 tablet by mouth daily variable       SELENIUM PO 70mcg by mouth daily as needed       VITAMIN D PO Drops by mouth daily (winter); usually 4000 international units  = 100mcg           Allergies:      Allergies   Allergen Reactions     Bee Venom      Shellfish Allergy             Past Medical History:     Past Medical History:   Diagnosis Date     CARDIOVASCULAR SCREENING; LDL GOAL LESS THAN 160 6/1/2011     Tremor 1/26/2022         Past Surgical History:     Past Surgical History:   Procedure Laterality Date     COLONOSCOPY       OTHER SURGICAL HISTORY      precancer skin lesion         Family Medical History:     Family History   Problem Relation Age of Onset     Eye Disorder Mother         glaucoma     Cardiovascular Mother          angina     Alzheimer Disease Mother      Dementia Mother      Glaucoma Mother      Skin Cancer Mother      Neurologic Disorder Mother         tremor     Hypertension Father      Dementia Father         cognitive and speech changes     Skin Cancer Father      Other - See Comments Father         ?progressive aphasia     Eye Disorder Sister         detached retina     Other - See Comments Sister      Eye Disorder Sister         glaucoma, suspect     Thyroid Disease Sister      Other - See Comments Sister      Other - See Comments Sister      Other - See Comments Brother      Retinal detachment Brother      Other - See Comments Brother      Other - See Comments Brother      Alcoholism Brother      Alcoholism Brother      Blood Disease Maternal Grandmother         lukemia     Eye Disorder Maternal Grandfather         glaucoma     Cerebrovascular Disease Maternal Grandfather      Neurologic Disorder Paternal Grandmother      Other - See Comments Daughter         lives in [place] - Masters degree in education     Dementia Paternal Uncle         like his brother; her father     Other - See Comments Natural child         lives in [facility]     Neurologic Disorder Other          Social History:     Social History     Socioeconomic History     Marital status:      Spouse name: Not on file     Number of children: Not on file     Years of education: Not on file     Highest education level: Not on file   Occupational History     Occupation: [employer]     Employer: [employer]   Tobacco Use     Smoking status: Never     Smokeless tobacco: Never   Vaping Use     Vaping Use: Never used   Substance and Sexual Activity     Alcohol use: Yes     Comment: 1 drink a week     Drug use: No     Sexual activity: Yes     Partners: Male   Other Topics Concern     Parent/sibling w/ CABG, MI or angioplasty before 65F 55M? 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         2 kids - non binary and female     Social Determinants of Health     Financial Resource Strain: Not on file   Food Insecurity: Not on file   Transportation Needs: Not on file   Physical Activity: Not on file   Stress: Not on file   Social Connections: Not on file   Intimate Partner Violence: Not on file   Housing Stability: Not on file           Review of System:     Constitutional: Negative for fever or chills  Skin: Negative for rashes  Ears/Nose/Throat: Negative for nasal congestion, sore throat, positive for BPPV vertigo symptoms  Respiratory: No shortness of breath, dyspnea on exertion, cough, or hemoptysis  Cardiovascular: Negative for chest pain  Gastrointestinal: positive for nausea  Genitourinary: Negative for dysuria, hematuria  Musculoskeletal: positive for mechanical right foot 1st MTP joint pains  Neurologic: Negative for headaches  Psychiatric: Negative for depression, anxiety  Hematologic/Lymphatic/Immunologic: Negative  Endocrine: Negative  Behavioral: Negative for tobacco use       Physical Exam:   /77 (BP Location: Right arm, Patient Position: Sitting, Cuff Size: Adult Regular)   Pulse 75   Temp 98  F (36.7  C) (Oral)   Resp 16   Ht 1.594 m (5' 2.76\")   Wt 61.2 kg (135 lb)   LMP 08/21/2013   SpO2 99%   Breastfeeding No   BMI 24.10 kg/m      GENERAL: alert and no distress  EYES: eyes grossly normal to inspection, and conjunctivae and sclerae normal  HENT: Normocephalic atraumatic.   NECK: supple  RESP: no cough present  CV: patient appears to be hemodynamically stable  LYMPH: no peripheral edema   ABDOMEN: nondistended  MS: right foot 1st MTP joint pains noted  SKIN: no suspicious lesions or rashes  NEURO: Alert & Oriented x 3.   PSYCH: mentation appears normal, affect normal        Diagnostic Test Results:         ASSESSMENT/PLAN:       Kasia was seen today for dizziness, nausea, foot pain and referral.    Diagnoses and all orders for this " visit:    Benign paroxysmal positional vertigo, unspecified laterality  -     Physical Therapy Referral; Future    Screening for hyperlipidemia    Hypercholesterolemia    Foot pain, right  -     Orthopedic  Referral; Future  -     XR Foot Right G/E 3 Views    Shellfish allergy  -     Adult Allergy/Asthma Referral; Future    Seafood allergy  -     Adult Allergy/Asthma Referral; Future            Follow Up Plan:     Patient is instructed to return to Internal Medicine clinic for follow-up visit in 1 month.        Sandy Hdz MD  Internal Medicine  [facility]

## 2023-04-01 ENCOUNTER — HEALTH MAINTENANCE LETTER (OUTPATIENT)
Age: 58
End: 2023-04-01

## 2023-04-21 ASSESSMENT — ENCOUNTER SYMPTOMS
DIARRHEA: 0
EYE PAIN: 0
FREQUENCY: 0
HEARTBURN: 0
COUGH: 0
CHILLS: 0
ABDOMINAL PAIN: 0
SORE THROAT: 0
PALPITATIONS: 0
BREAST MASS: 0
FEVER: 0
HEMATURIA: 0
HEMATOCHEZIA: 0
MYALGIAS: 0
HEADACHES: 0
SHORTNESS OF BREATH: 0
NERVOUS/ANXIOUS: 0
DIZZINESS: 0
NAUSEA: 0
PARESTHESIAS: 0
JOINT SWELLING: 0
CONSTIPATION: 0
ARTHRALGIAS: 0
WEAKNESS: 0
DYSURIA: 0

## 2023-04-28 ENCOUNTER — OFFICE VISIT (OUTPATIENT)
Dept: FAMILY MEDICINE | Facility: CLINIC | Age: 58
End: 2023-04-28
Payer: COMMERCIAL

## 2023-04-28 VITALS
TEMPERATURE: 97.4 F | DIASTOLIC BLOOD PRESSURE: 84 MMHG | RESPIRATION RATE: 18 BRPM | HEART RATE: 68 BPM | BODY MASS INDEX: 24.48 KG/M2 | SYSTOLIC BLOOD PRESSURE: 135 MMHG | HEIGHT: 62 IN | WEIGHT: 133.04 LBS | OXYGEN SATURATION: 99 %

## 2023-04-28 DIAGNOSIS — R25.1 TREMOR: ICD-10-CM

## 2023-04-28 DIAGNOSIS — E05.00 GRAVES DISEASE: ICD-10-CM

## 2023-04-28 DIAGNOSIS — N18.1 STAGE 1 CHRONIC KIDNEY DISEASE: ICD-10-CM

## 2023-04-28 DIAGNOSIS — Z00.00 ROUTINE GENERAL MEDICAL EXAMINATION AT A HEALTH CARE FACILITY: Primary | ICD-10-CM

## 2023-04-28 DIAGNOSIS — E78.00 HIGH CHOLESTEROL: ICD-10-CM

## 2023-04-28 DIAGNOSIS — Z91.013 SHELLFISH ALLERGY: ICD-10-CM

## 2023-04-28 DIAGNOSIS — E78.1 HYPERTRIGLYCERIDEMIA: ICD-10-CM

## 2023-04-28 DIAGNOSIS — Z91.038 HYMENOPTERA ALLERGY: ICD-10-CM

## 2023-04-28 DIAGNOSIS — Z81.8 FAMILY HISTORY OF DEMENTIA: ICD-10-CM

## 2023-04-28 DIAGNOSIS — R31.29 MICROSCOPIC HEMATURIA: ICD-10-CM

## 2023-04-28 LAB
ANION GAP SERPL CALCULATED.3IONS-SCNC: 12 MMOL/L (ref 7–15)
BUN SERPL-MCNC: 12.8 MG/DL (ref 8–23)
CALCIUM SERPL-MCNC: 10.2 MG/DL (ref 8.6–10)
CHLORIDE SERPL-SCNC: 102 MMOL/L (ref 98–107)
CHOLEST SERPL-MCNC: 317 MG/DL
CREAT SERPL-MCNC: 0.61 MG/DL (ref 0.51–0.95)
CREAT UR-MCNC: 39.5 MG/DL
DEPRECATED HCO3 PLAS-SCNC: 27 MMOL/L (ref 22–29)
GFR SERPL CREATININE-BSD FRML MDRD: >90 ML/MIN/1.73M2
GLUCOSE SERPL-MCNC: 99 MG/DL (ref 70–99)
HDLC SERPL-MCNC: 87 MG/DL
LDLC SERPL CALC-MCNC: 185 MG/DL
MICROALBUMIN UR-MCNC: 45.7 MG/L
MICROALBUMIN/CREAT UR: 115.7 MG/G CR (ref 0–25)
NONHDLC SERPL-MCNC: 230 MG/DL
POTASSIUM SERPL-SCNC: 4.8 MMOL/L (ref 3.4–5.3)
SODIUM SERPL-SCNC: 141 MMOL/L (ref 136–145)
TRIGL SERPL-MCNC: 226 MG/DL
TSH SERPL DL<=0.005 MIU/L-ACNC: 2.12 UIU/ML (ref 0.3–4.2)

## 2023-04-28 PROCEDURE — 36415 COLL VENOUS BLD VENIPUNCTURE: CPT | Performed by: INTERNAL MEDICINE

## 2023-04-28 PROCEDURE — 82043 UR ALBUMIN QUANTITATIVE: CPT | Performed by: INTERNAL MEDICINE

## 2023-04-28 PROCEDURE — 99214 OFFICE O/P EST MOD 30 MIN: CPT | Mod: 25 | Performed by: INTERNAL MEDICINE

## 2023-04-28 PROCEDURE — 99396 PREV VISIT EST AGE 40-64: CPT | Performed by: INTERNAL MEDICINE

## 2023-04-28 PROCEDURE — 80061 LIPID PANEL: CPT | Performed by: INTERNAL MEDICINE

## 2023-04-28 PROCEDURE — 82570 ASSAY OF URINE CREATININE: CPT | Performed by: INTERNAL MEDICINE

## 2023-04-28 PROCEDURE — 80048 BASIC METABOLIC PNL TOTAL CA: CPT | Performed by: INTERNAL MEDICINE

## 2023-04-28 PROCEDURE — 84443 ASSAY THYROID STIM HORMONE: CPT | Performed by: INTERNAL MEDICINE

## 2023-04-28 RX ORDER — CHOLECALCIFEROL (VITAMIN D3) 10 MCG
300 TABLET ORAL DAILY
COMMUNITY
Start: 2023-04-28

## 2023-04-28 ASSESSMENT — ENCOUNTER SYMPTOMS
CHILLS: 0
COUGH: 0
HEADACHES: 0
DIARRHEA: 0
CONSTIPATION: 0
FEVER: 0
PALPITATIONS: 0
NERVOUS/ANXIOUS: 0
HEARTBURN: 0
ARTHRALGIAS: 0
SORE THROAT: 0
MYALGIAS: 0
SHORTNESS OF BREATH: 0
DYSURIA: 0
EYE PAIN: 0
FREQUENCY: 0
HEMATOCHEZIA: 0
HEMATURIA: 0
NAUSEA: 0
WEAKNESS: 0
JOINT SWELLING: 0
BREAST MASS: 0
ABDOMINAL PAIN: 0
PARESTHESIAS: 0
DIZZINESS: 0

## 2023-04-28 ASSESSMENT — PAIN SCALES - GENERAL: PAINLEVEL: NO PAIN (0)

## 2023-04-28 NOTE — PROGRESS NOTES
SUBJECTIVE:   CC: Kasia is an 58 year old who presents for preventive health visit.       4/28/2023    10:54 AM   Additional Questions   Roomed by Beth     Patient has been advised of split billing requirements and indicates understanding: Yes  Healthy Habits:     Getting at least 3 servings of Calcium per day:  Yes    Bi-annual eye exam:  Yes    Dental care twice a year:  Yes    Sleep apnea or symptoms of sleep apnea:  None    Diet:  Gluten-free/reduced    Frequency of exercise:  4-5 days/week    Duration of exercise:  30-45 minutes    Taking medications regularly:  Yes    Medication side effects:  Not applicable    PHQ-2 Total Score: 0    Additional concerns today:  Yes    Has historically had nephrologist, but hasn't had anyone for a while.  Doesn't check blood pressure at home- today's is borderline high but last month it was normal at 121/77.    Also has tremor and saw neurologist Dr. Quintero in 2022 who recommended genetic testing with Nate Simental (hasn't done yet but she would like to do).  He recommended genetic testing because she has a family history of dementia (although he did think her tremor is most likely an essential tremor since it is a resting tremor).  She will reach out to Nate Simental to schedule follow up.    Her  Theo unfortunately has been diagnosed with long COVID.  He is having cognitive and processing impairment in addition to significant emotional distress, hearing and vision impairment.  This has been very hard on both of them.  I filled out [facility] paperwork this visit for Kasia since she is needing to care for him (copy made, placed in abstracting by MA).    Kasia has also worked with a naturopath and homeopath for years.      Today's PHQ-2 Score:       4/21/2023     1:25 PM   PHQ-2 ( 1999 Pfizer)   Q1: Little interest or pleasure in doing things 0   Q2: Feeling down, depressed or hopeless 0   PHQ-2 Score 0   Q1: Little interest or pleasure in doing things Not at all    Q2: Feeling down, depressed or hopeless Not at all   PHQ-2 Score 0           Social History     Tobacco Use     Smoking status: Never     Smokeless tobacco: Never   Vaping Use     Vaping status: Never Used   Substance Use Topics     Alcohol use: Yes     Comment: once/month if that             2023     1:25 PM   Alcohol Use   Prescreen: >3 drinks/day or >7 drinks/week? No     Reviewed orders with patient.  Reviewed health maintenance and updated orders accordingly - Yes  Lab work is in process    Breast Cancer Screenin/24/2021     9:00 AM   Breast CA Risk Assessment (FHS-7)   Do you have a family history of breast, colon, or ovarian cancer? No / Unknown         Mammogram Screening: Recommended mammography every 1 years with patient discussion and risk factor consideration  Pertinent mammograms are reviewed under the imaging tab.    History of abnormal Pap smear: NO - age 30-65 PAP every 5 years with negative HPV co-testing recommended      Latest Ref Rng & Units 2021     9:36 AM 2020     8:20 AM 2013    12:00 AM   PAP / HPV   PAP  Negative for Intraepithelial Lesion or Malignancy (NILM)   Negative for squamous intraepithelial lesion or malignancy  Electronically signed by Corazon Frederick CT (ASCP) on 2020 at 11:16 AM        PAP (Historical)    ASC-US     HPV 16 DNA Negative Negative   Negative      HPV 18 DNA Negative Negative   Negative      Other HR HPV Negative Negative   Negative        Reviewed and updated as needed this visit by clinical staff   Tobacco  Allergies  Meds  Problems  Med Hx  Surg Hx  Fam Hx          Reviewed and updated as needed this visit by Provider   Tobacco  Allergies  Meds  Problems  Med Hx  Surg Hx  Fam Hx             Review of Systems   Constitutional: Negative for chills and fever.   HENT: Negative for congestion, ear pain, hearing loss and sore throat.    Eyes: Negative for pain and visual disturbance.   Respiratory: Negative for  "cough and shortness of breath.    Cardiovascular: Negative for chest pain, palpitations and peripheral edema.   Gastrointestinal: Negative for abdominal pain, constipation, diarrhea, heartburn, hematochezia and nausea.   Breasts:  Negative for tenderness, breast mass and discharge.   Genitourinary: Negative for dysuria, frequency, genital sores, hematuria, pelvic pain, urgency, vaginal bleeding and vaginal discharge.   Musculoskeletal: Negative for arthralgias, joint swelling and myalgias.   Skin: Negative for rash.   Neurological: Negative for dizziness, weakness, headaches and paresthesias.   Psychiatric/Behavioral: Negative for mood changes. The patient is not nervous/anxious.         OBJECTIVE:   /84 (BP Location: Right arm, Patient Position: Sitting, Cuff Size: Adult Regular)   Pulse 68   Temp 97.4  F (36.3  C) (Temporal)   Resp 18   Ht 1.575 m (5' 2\")   Wt 60.3 kg (133 lb 0.6 oz)   LMP 08/21/2013   SpO2 99%   BMI 24.33 kg/m    Physical Exam  GENERAL APPEARANCE: healthy, alert and no distress  EYES: Eyes grossly normal to inspection, PERRL and conjunctivae and sclerae normal  HENT: ear canals and TM's normal, nose and mouth without ulcers or lesions, oropharynx clear and oral mucous membranes moist  NECK: no adenopathy, no asymmetry, masses, or scars and thyroid normal to palpation  RESP: lungs clear to auscultation - no rales, rhonchi or wheezes  BREAST: normal without masses, tenderness or nipple discharge and no palpable axillary masses or adenopathy  CV: regular rate and rhythm, normal S1 S2, no S3 or S4, no murmur, click or rub, no peripheral edema and peripheral pulses strong  ABDOMEN: soft, nontender, no hepatosplenomegaly, no masses and bowel sounds normal  MS: no musculoskeletal defects are noted and gait is age appropriate without ataxia  SKIN: no suspicious lesions or rashes  NEURO: Normal patellar DTR.  PSYCH: mentation appears normal and affect normal/bright    Diagnostic Test " Results:  Labs reviewed in Epic    ASSESSMENT/PLAN:   Kasia was seen today for physical.    Diagnoses and all orders for this visit:    Routine general medical examination at a health care facility    Mammo: UTD Jan 2023, dense breasts- rec mammogram annually    Pap: Due 2026    Colon: 2026 due    Immunizations: Defer shingles to when she can take time off work    Discussed healthy lifestyle and aging recommendations including regular exercise, adequate and regular sleep, 5+ fruits and veggies daily.    -     Basic metabolic panel  (Ca, Cl, CO2, Creat, Gluc, K, Na, BUN); Future  -     Cancel: Lipid panel reflex to direct LDL Fasting; Future  -     Lipid panel reflex to direct LDL Non-fasting; Future  -     Basic metabolic panel  (Ca, Cl, CO2, Creat, Gluc, K, Na, BUN)  -     Lipid panel reflex to direct LDL Non-fasting    Stage 1 chronic kidney disease  Microscopic hematuria  Comments:  Possible thin basement membrane disease.  Cr baseline ~0.6.  Orders:  -     Albumin Random Urine Quantitative with Creat Ratio; Future  -     Albumin Random Urine Quantitative with Creat Ratio    Graves disease  Comments:  No ablation.  Not on any medications.  Had been seen by homeopath and took supplements and thyroid function normalized.  Orders:  -     TSH with free T4 reflex; Future  -     TSH with free T4 reflex    Hymenoptera allergy  Comments:  Large red welts after bee stings.  Used to have epi pen but doesn't anymore.  Referred to allergy given additional shellfish allergy questions.  Orders:  -     Adult Allergy/Asthma Referral; Future    Shellfish allergy  Comments:  Developed as an adult with eating lobster (had significant GI reaction).  Loves shellfish, interesting in seeing allergy medicine, consult placed.  Orders:  -     Adult Allergy/Asthma Referral; Future    Tremor  Family history of dementia  Comments:  Likely essential tremor.  Seen by Dr. Quintero Neurology 2022.  Referred to genetics counselor for further  testing given strong family history of dementia.  Orders:  -     Adult Genetics & Metabolism Referral; Future  - I reached out to Nate Simental Genetics counselor who will arrange follow up with her    Other orders  -     REVIEW OF HEALTH MAINTENANCE PROTOCOL ORDERS  -     PRIMARY CARE FOLLOW-UP SCHEDULING; Future      Patient has been advised of split billing requirements and indicates understanding: Yes      COUNSELING:  Reviewed preventive health counseling, as reflected in patient instructions        She reports that she has never smoked. She has never used smokeless tobacco.      Gianna Grady MD  [facility]

## 2023-05-08 ENCOUNTER — TELEPHONE (OUTPATIENT)
Dept: FAMILY MEDICINE | Facility: CLINIC | Age: 58
End: 2023-05-08
Payer: COMMERCIAL

## 2023-05-08 NOTE — TELEPHONE ENCOUNTER
Forms/Letter Request    Type of form/letter: FMLA - Unknown    Have you been seen for this request: Yes     Do we have the form/letter: YES     Who is the form from? Patient    Where did/will the form come from? Patient or family brought in       When is form/letter needed by: 5/20/23    How would you like the form/letter returned: Mail  Is this the correct address?: Yes  [id number] [facility] [id number]    Patient Notified form requests are processed in 3-5 business days:Yes    Could we send this information to you in [employer] or would you prefer to receive a phone call?:  [phone]

## 2023-05-10 NOTE — TELEPHONE ENCOUNTER
Copy sent to abstract  Copy kept in clinic  Original placed in outgoing mail bin to address on file-

## 2023-09-29 ENCOUNTER — TELEPHONE (OUTPATIENT)
Dept: FAMILY MEDICINE | Facility: CLINIC | Age: 58
End: 2023-09-29
Payer: COMMERCIAL

## 2023-09-29 NOTE — TELEPHONE ENCOUNTER
Forms/Letter Request    Type of form/letter: FMLA - Unknown    Have you been seen for this request: N/A    Do we have the form/letter: Yes: IN CARE TEAM 1    Who is the form from? Patient    Where did/will the form come from? Patient or family brought in       When is form/letter needed by: ASAP    How would you like the form/letter returned:     Patient Notified form requests are processed in 3-5 business days:Yes    Could we send this information to you in [employer] or would you prefer to receive a phone call?:   No preference   Okay to leave a detailed message?: Yes at Home number on file [phone] (home)

## 2023-10-02 NOTE — TELEPHONE ENCOUNTER
Forms done - in Pod 1.  Please her her know I'm so glad her 's doing a little better.  I also noticed there is a spot for her to sign that she'll need to sign before submitting.  Gianna Grady, DO  Internal Medicine - Pediatrics Physician  [facility]

## 2023-10-13 ENCOUNTER — OFFICE VISIT (OUTPATIENT)
Dept: ALLERGY | Facility: CLINIC | Age: 58
End: 2023-10-13
Attending: INTERNAL MEDICINE
Payer: COMMERCIAL

## 2023-10-13 VITALS
BODY MASS INDEX: 25.2 KG/M2 | OXYGEN SATURATION: 99 % | HEART RATE: 76 BPM | WEIGHT: 137.8 LBS | RESPIRATION RATE: 16 BRPM | SYSTOLIC BLOOD PRESSURE: 118 MMHG | DIASTOLIC BLOOD PRESSURE: 77 MMHG

## 2023-10-13 DIAGNOSIS — T63.441A BEE STING REACTION, ACCIDENTAL OR UNINTENTIONAL, INITIAL ENCOUNTER: ICD-10-CM

## 2023-10-13 DIAGNOSIS — T78.1XXA ADVERSE FOOD REACTION, INITIAL ENCOUNTER: Primary | ICD-10-CM

## 2023-10-13 DIAGNOSIS — Z91.013 SHELLFISH ALLERGY: ICD-10-CM

## 2023-10-13 DIAGNOSIS — Z91.038 HYMENOPTERA ALLERGY: ICD-10-CM

## 2023-10-13 PROCEDURE — 99203 OFFICE O/P NEW LOW 30 MIN: CPT | Performed by: INTERNAL MEDICINE

## 2023-10-13 RX ORDER — PREDNISONE 20 MG/1
40 TABLET ORAL DAILY
Qty: 20 TABLET | Refills: 0 | Status: SHIPPED | OUTPATIENT
Start: 2023-10-13 | End: 2024-06-17

## 2023-10-13 ASSESSMENT — ENCOUNTER SYMPTOMS
ARTHRALGIAS: 0
ABDOMINAL PAIN: 0
SHORTNESS OF BREATH: 0
VOMITING: 0
FEVER: 0
SORE THROAT: 0
BACK PAIN: 0
SEIZURES: 0
COLOR CHANGE: 0
HEMATURIA: 0
PALPITATIONS: 0
COUGH: 0
EYE PAIN: 0
CHILLS: 0
DYSURIA: 0

## 2023-10-13 NOTE — PATIENT INSTRUCTIONS
Zyrtec 10 mg twice daily and ice.  Prednisone 40 mg daily as needed for swelling up to 5 days.        Allergy Staff Appt Hours Shot Hours Location       Physician   Martin Johnson MD      Support Staff   JORDON Guerrero, JORDON ANDREA MA         Mondays Tuesdays Thursdays and Fridays:      [place] 7-5 Wednesdays         Close                Mondays, Tuesdays and Fridays:  7:20 - 3:40              [facility]  [address]  [place] [address]  Allergy appointment  line: [phone]    Pulmonary Function Scheduling:  [place]: [phone]

## 2023-10-13 NOTE — PROGRESS NOTES
Kasia Macedo was seen in the Allergy Clinic at [facility].    Kasia Macedo is a 58 year old female being seen today at the request of Gianna Grady DO  in consultation for concerns about a venom reaction as well as shellfish reaction.    For the bee stings she has developed large local reactions that can be debilitating.  She describes severe swelling of the hand and foot that she loses function for up to 1 week.  She never had any systemic symptoms such as hives or shortness of breath or vomiting.    For the shellfish it was over 30 years ago she ate lobster and developed diarrhea 2 hours after ingestion.  There was not any vomiting and no hives.  Mussels may have caused minor diarrhea at one point.  She can eat fish without any difficulty.  She has avoided all forms of shellfish for several decades.    She would like to get this further evaluated.  She does not have an EpiPen.    Past Medical History:   Diagnosis Date    CARDIOVASCULAR SCREENING; LDL GOAL LESS THAN 160 06/01/2011    Thyroid disease 2015?    Tremor 01/26/2022     Family History   Problem Relation Age of Onset    Eye Disorder Mother         glaucoma    Cardiovascular Mother         angina    Alzheimer Disease Mother     Dementia Mother     Glaucoma Mother     Skin Cancer Mother     Neurologic Disorder Mother         tremor    Hyperlipidemia Mother     Hypertension Father     Dementia Father         cognitive and speech changes    Skin Cancer Father     Other - See Comments Father         ?progressive aphasia    Eye Disorder Sister         detached retina    Other - See Comments Sister     Eye Disorder Sister         glaucoma, suspect    Thyroid Disease Sister     Other - See Comments Sister     Other - See Comments Sister     Other - See Comments Brother     Retinal detachment Brother     Other - See Comments Brother     Other - See Comments Brother     Alcoholism Brother     Coronary Artery Disease Brother          not treated;  from it    Substance Abuse Brother     Alcoholism Brother     Substance Abuse Brother     Blood Disease Maternal Grandmother         lukemia    Eye Disorder Maternal Grandfather         glaucoma    Cerebrovascular Disease Maternal Grandfather     Neurologic Disorder Paternal Grandmother     Other - See Comments Daughter         lives in [place] - Masters degree in education    Dementia Paternal Uncle         like his brother; her father    Other - See Comments Natural child         lives in [facility]    Neurologic Disorder Other      Past Surgical History:   Procedure Laterality Date    COLONOSCOPY      OTHER SURGICAL HISTORY      precancer skin lesion       ENVIRONMENTAL HISTORY:   Pets inside the house include None.  Do you smoke cigarettes or other recreational drugs? No There is/are 0 smokers living in the house. The house do not have a damp basement.     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      Review of Systems   Constitutional:  Negative for chills and fever.   HENT:  Negative for ear pain and sore throat.    Eyes:  Negative for pain and visual disturbance.   Respiratory:  Negative for cough and shortness of breath.    Cardiovascular:  Negative for chest pain and palpitations.   Gastrointestinal:  Negative for abdominal pain and vomiting.   Genitourinary:  Negative for dysuria and hematuria.   Musculoskeletal:  Negative for arthralgias and back pain.   Skin:  Negative for color change and rash.   Neurological:  Negative for seizures and syncope.   All other systems reviewed and are negative.        Current Outpatient Medications:     predniSONE (DELTASONE) 20 MG tablet, Take 2 tablets (40 mg) by mouth daily, Disp: 20 tablet, Rfl: 0    FISH OIL-LUTEIN-D-ZEAXANTHIN PO, Different fish oil product by mouth daily, Disp: , Rfl:     fish oil-omega-3 fatty acids 1000 MG capsule, capsule, Disp: , Rfl:     Lactobacillus (ACIDOPHILUS) TABS, daily, Disp:  , Rfl:     magnesium gluconate 27.5 MG TABS, Take 300 mg by mouth daily, Disp: , Rfl:     Multiple Vitamin (MULTIVITAMIN ADULT PO), Take 1 tablet by mouth daily variable, Disp: , Rfl:     SELENIUM PO, 70mcg by mouth daily as needed, Disp: , Rfl:     VITAMIN D PO, Drops by mouth daily (winter); usually 4000 international units  = 100mcg, Disp: , Rfl:   Allergies   Allergen Reactions    Bee Venom     Shellfish Allergy              EXAM:   /77 (BP Location: Left arm, Patient Position: Sitting, Cuff Size: Adult Regular)   Pulse 76   Resp 16   Wt 62.5 kg (137 lb 12.8 oz)   LMP 08/21/2013   SpO2 99%   BMI 25.20 kg/m      Physical Exam    Constitutional:       General: She is not in acute distress.     Appearance: Normal appearance. She is not ill-appearing.   HENT:      Head: Normocephalic and atraumatic.     Eyes:      General:         Right eye: No discharge.         Left eye: No discharge.   Neurological:      General: No focal deficit present.      Mental Status: She is alert. Mental status is at baseline.   Psychiatric:         Mood and Affect: Mood normal.         Behavior: Behavior normal.        ASSESSMENT/PLAN:  Kasia Macedo is a 58 year old female seen today for with concerns for shellfish allergy as well as venom allergy.  Her venom reaction sounds like a large local reaction.  This generally does not increase risk for systemic reactions to bees stings.  We will check IgE levels to venoms.  If elevated I would consider sending an EpiPen, however based on her history she does not need an Epipen.    For the reaction to shellfish, this does not sound classic for an allergic reaction since it does not involve vomiting or hives.  It also does not sound consistent with food protein induced enterocolitis syndrome since it also does not involve vomiting.  We will see what the blood test look like for shellfish.  She may consider eating this in the future if normal.    Follow-up if necessary.      Thank  you for allowing me to participate in the care of Kasia ADEEL Macedo.      I spent 30 minutes on the date of the encounter doing chart review, history and exam, documentation and further coordination as noted above exclusive of separately reported interpretations    Martin Johnson MD  Allergy/Immunology  [facility]

## 2023-10-13 NOTE — LETTER
10/13/2023         RE: Kasia Macedo  [address]  [facility] [address]        Dear Colleague,    Thank you for referring your patient, Kasia Macedo, to the [facility] [place]. Please see a copy of my visit note below.    Kasia Macedo was seen in the Allergy Clinic at [facility].    Kasia Macedo is a 58 year old female being seen today at the request of Gianna Grady DO  in consultation for concerns about a venom reaction as well as shellfish reaction.    For the bee stings she has developed large local reactions that can be debilitating.  She describes severe swelling of the hand and foot that she loses function for up to 1 week.  She never had any systemic symptoms such as hives or shortness of breath or vomiting.    For the shellfish it was over 30 years ago she ate lobster and developed diarrhea 2 hours after ingestion.  There was not any vomiting and no hives.  Mussels may have caused minor diarrhea at one point.  She can eat fish without any difficulty.  She has avoided all forms of shellfish for several decades.    She would like to get this further evaluated.  She does not have an EpiPen.    Past Medical History:   Diagnosis Date     CARDIOVASCULAR SCREENING; LDL GOAL LESS THAN 160 06/01/2011     Thyroid disease 2015?     Tremor 01/26/2022     Family History   Problem Relation Age of Onset     Eye Disorder Mother         glaucoma     Cardiovascular Mother         angina     Alzheimer Disease Mother      Dementia Mother      Glaucoma Mother      Skin Cancer Mother      Neurologic Disorder Mother         tremor     Hyperlipidemia Mother      Hypertension Father      Dementia Father         cognitive and speech changes     Skin Cancer Father      Other - See Comments Father         ?progressive aphasia     Eye Disorder Sister         detached retina     Other - See Comments Sister      Eye Disorder Sister         glaucoma,  suspect     Thyroid Disease Sister      Other - See Comments Sister      Other - See Comments Sister      Other - See Comments Brother      Retinal detachment Brother      Other - See Comments Brother      Other - See Comments Brother      Alcoholism Brother      Coronary Artery Disease Brother         not treated;  from it     Substance Abuse Brother      Alcoholism Brother      Substance Abuse Brother      Blood Disease Maternal Grandmother         lukemia     Eye Disorder Maternal Grandfather         glaucoma     Cerebrovascular Disease Maternal Grandfather      Neurologic Disorder Paternal Grandmother      Other - See Comments Daughter         lives in [place] - Masters degree in education     Dementia Paternal Uncle         like his brother; her father     Other - See Comments Natural child         lives in [place] [place] [facility]\""     Neurologic Disorder Other      Past Surgical History:   Procedure Laterality Date     COLONOSCOPY       OTHER SURGICAL HISTORY      precancer skin lesion       ENVIRONMENTAL HISTORY:   Pets inside the house include None.  Do you smoke cigarettes or other recreational drugs? No There is/are 0 smokers living in the house. The house do not have a damp basement.     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      Review of Systems   Constitutional:  Negative for chills and fever.   HENT:  Negative for ear pain and sore throat.    Eyes:  Negative for pain and visual disturbance.   Respiratory:  Negative for cough and shortness of breath.    Cardiovascular:  Negative for chest pain and palpitations.   Gastrointestinal:  Negative for abdominal pain and vomiting.   Genitourinary:  Negative for dysuria and hematuria.   Musculoskeletal:  Negative for arthralgias and back pain.   Skin:  Negative for color change and rash.   Neurological:  Negative for seizures and syncope.   All other systems reviewed and are negative.        Current Outpatient Medications:       predniSONE (DELTASONE) 20 MG tablet, Take 2 tablets (40 mg) by mouth daily, Disp: 20 tablet, Rfl: 0     FISH OIL-LUTEIN-D-ZEAXANTHIN PO, Different fish oil product by mouth daily, Disp: , Rfl:      fish oil-omega-3 fatty acids 1000 MG capsule, capsule, Disp: , Rfl:      Lactobacillus (ACIDOPHILUS) TABS, daily, Disp: , Rfl:      magnesium gluconate 27.5 MG TABS, Take 300 mg by mouth daily, Disp: , Rfl:      Multiple Vitamin (MULTIVITAMIN ADULT PO), Take 1 tablet by mouth daily variable, Disp: , Rfl:      SELENIUM PO, 70mcg by mouth daily as needed, Disp: , Rfl:      VITAMIN D PO, Drops by mouth daily (winter); usually 4000 international units  = 100mcg, Disp: , Rfl:   Allergies   Allergen Reactions     Bee Venom      Shellfish Allergy              EXAM:   /77 (BP Location: Left arm, Patient Position: Sitting, Cuff Size: Adult Regular)   Pulse 76   Resp 16   Wt 62.5 kg (137 lb 12.8 oz)   LMP 08/21/2013   SpO2 99%   BMI 25.20 kg/m      Physical Exam    Constitutional:       General: She is not in acute distress.     Appearance: Normal appearance. She is not ill-appearing.   HENT:      Head: Normocephalic and atraumatic.     Eyes:      General:         Right eye: No discharge.         Left eye: No discharge.   Neurological:      General: No focal deficit present.      Mental Status: She is alert. Mental status is at baseline.   Psychiatric:         Mood and Affect: Mood normal.         Behavior: Behavior normal.        ASSESSMENT/PLAN:  Kasia Macedo is a 58 year old female seen today for with concerns for shellfish allergy as well as venom allergy.  Her venom reaction sounds like a large local reaction.  This generally does not increase risk for systemic reactions to bees stings.  We will check IgE levels to venoms.  If elevated I would consider sending an EpiPen, however based on her history she does not need an Epipen.    For the reaction to shellfish, this does not sound classic for an allergic  reaction since it does not involve vomiting or hives.  It also does not sound consistent with food protein induced enterocolitis syndrome since it also does not involve vomiting.  We will see what the blood test look like for shellfish.  She may consider eating this in the future if normal.    Follow-up if necessary.      Thank you for allowing me to participate in the care of Kasia Macedo.      I spent 30 minutes on the date of the encounter doing chart review, history and exam, documentation and further coordination as noted above exclusive of separately reported interpretations    Martin Johnson MD  Allergy/Immunology  [facility]         Again, thank you for allowing me to participate in the care of your patient.        Sincerely,        Martin Johnson MD

## 2023-10-17 ENCOUNTER — LAB (OUTPATIENT)
Dept: LAB | Facility: CLINIC | Age: 58
End: 2023-10-17
Payer: COMMERCIAL

## 2023-10-17 DIAGNOSIS — T78.1XXA ADVERSE FOOD REACTION, INITIAL ENCOUNTER: ICD-10-CM

## 2023-10-17 DIAGNOSIS — T63.441A BEE STING REACTION, ACCIDENTAL OR UNINTENTIONAL, INITIAL ENCOUNTER: ICD-10-CM

## 2023-10-17 PROCEDURE — 86003 ALLG SPEC IGE CRUDE XTRC EA: CPT

## 2023-10-17 PROCEDURE — 36415 COLL VENOUS BLD VENIPUNCTURE: CPT

## 2023-10-19 LAB
CLAM IGE QN: <0.1 KU(A)/L
CRAB IGE QN: <0.1 KU(A)/L
HONEY BEE IGE QN: <0.1 KU(A)/L
LOBSTER IGE QN: <0.1 KU(A)/L
OYSTER IGE QN: <0.1 KU(A)/L
PAPER WASP IGE QN: <0.1 KU(A)/L
SCALLOP IGE QN: <0.1 KU(A)/L
SHRIMP IGE QN: <0.1 KU(A)/L
WHITEFACED HORNET IGE QN: <0.1 KU(A)/L
YELLOW HORNET IGE QN: <0.1 KU(A)/L
YELLOW JACKET IGE QN: 0.24 KU(A)/L

## 2023-10-21 ENCOUNTER — MYC MEDICAL ADVICE (OUTPATIENT)
Dept: ALLERGY | Facility: CLINIC | Age: 58
End: 2023-10-21
Payer: COMMERCIAL

## 2023-11-21 ENCOUNTER — IMMUNIZATION (OUTPATIENT)
Dept: FAMILY MEDICINE | Facility: CLINIC | Age: 58
End: 2023-11-21
Payer: COMMERCIAL

## 2023-11-21 DIAGNOSIS — Z23 HIGH PRIORITY FOR 2019-NCOV VACCINE: Primary | ICD-10-CM

## 2023-11-21 PROCEDURE — 91320 SARSCV2 VAC 30MCG TRS-SUC IM: CPT

## 2023-11-21 PROCEDURE — 90480 ADMN SARSCOV2 VAC 1/ONLY CMP: CPT

## 2024-01-08 ENCOUNTER — IMMUNIZATION (OUTPATIENT)
Dept: FAMILY MEDICINE | Facility: CLINIC | Age: 59
End: 2024-01-08
Payer: COMMERCIAL

## 2024-01-08 DIAGNOSIS — Z23 NEED FOR PROPHYLACTIC VACCINATION AND INOCULATION AGAINST INFLUENZA: Primary | ICD-10-CM

## 2024-01-08 PROCEDURE — 90471 IMMUNIZATION ADMIN: CPT

## 2024-01-08 PROCEDURE — 90686 IIV4 VACC NO PRSV 0.5 ML IM: CPT

## 2024-01-08 PROCEDURE — 99207 PR NO CHARGE NURSE ONLY: CPT

## 2024-01-13 ENCOUNTER — HEALTH MAINTENANCE LETTER (OUTPATIENT)
Age: 59
End: 2024-01-13

## 2024-03-26 ENCOUNTER — OFFICE VISIT (OUTPATIENT)
Dept: INTERNAL MEDICINE | Facility: CLINIC | Age: 59
End: 2024-03-26
Payer: COMMERCIAL

## 2024-03-26 VITALS
RESPIRATION RATE: 11 BRPM | HEART RATE: 93 BPM | TEMPERATURE: 97.8 F | OXYGEN SATURATION: 99 % | WEIGHT: 136 LBS | DIASTOLIC BLOOD PRESSURE: 80 MMHG | HEIGHT: 62 IN | BODY MASS INDEX: 25.03 KG/M2 | SYSTOLIC BLOOD PRESSURE: 126 MMHG

## 2024-03-26 DIAGNOSIS — M54.2 CERVICALGIA: ICD-10-CM

## 2024-03-26 DIAGNOSIS — E05.00 GRAVES DISEASE: Primary | ICD-10-CM

## 2024-03-26 PROBLEM — E78.00 HYPERCHOLESTEROLEMIA: Status: RESOLVED | Noted: 2017-05-01 | Resolved: 2024-03-26

## 2024-03-26 PROCEDURE — 99214 OFFICE O/P EST MOD 30 MIN: CPT | Performed by: INTERNAL MEDICINE

## 2024-03-26 ASSESSMENT — PAIN SCALES - GENERAL: PAINLEVEL: MILD PAIN (3)

## 2024-03-26 NOTE — PROGRESS NOTES
ASSESSMENT AND PLAN:    1. Graves disease  S/p spontaneous remission.  Does follow her hormone levels and she is aware that hyperthyroidism could recur.      2. Cervicalgia  Exam and history are consistent with postural myofascial muscle tension and spasm. There is no clinical indication of radiculopathy.  We discussed this etiology and treatments.  She is interested in physical therapy for help in learning exercises and strengthening.      Follow up yearly  as needed.     CHIEF COMPLAINT:  Recent knee pain, and neck pain    HISTORY OF PRESENT ILLNESS:  Kasia Macedo is a 59 year old female with recent bilateral knee pain that has improved. This was mostly with going up or down stairs suggestive of mild chrondromalacia.  Seems resolved. Does also get neck pain.  This has been noticed with meditation. She does not get headache or arm or leg numbness or weakness.  She has otherwise been well.      REVIEW OF SYSTEMS:   See HPI, all other systems on review are negative.    Past Medical History:   Diagnosis Date    Hyperlipidemia LDL goal <130     Thyroid disease 2015?    Tremor 01/26/2022     History   Smoking Status    Never   Smokeless Tobacco    Never     Family History   Problem Relation Age of Onset    Eye Disorder Mother         glaucoma    Cardiovascular Mother         angina    Alzheimer Disease Mother     Dementia Mother     Glaucoma Mother     Skin Cancer Mother     Neurologic Disorder Mother         tremor    Hyperlipidemia Mother     Coronary Artery Disease Mother     Hypertension Father     Dementia Father         cognitive and speech changes    Skin Cancer Father     Other - See Comments Father         ?progressive aphasia    Eye Disorder Sister         detached retina    Other - See Comments Sister     Eye Disorder Sister         glaucoma, suspect    Thyroid Disease Sister     Other - See Comments Sister     Other - See Comments Sister     Other - See Comments Brother     Retinal detachment Brother   "   Other - See Comments Brother     Other - See Comments Brother     Alcoholism Brother     Coronary Artery Disease Brother         not treated;  from it    Substance Abuse Brother     Alcoholism Brother     Substance Abuse Brother     Blood Disease Maternal Grandmother         lukemia    Eye Disorder Maternal Grandfather         glaucoma    Cerebrovascular Disease Maternal Grandfather     Neurologic Disorder Paternal Grandmother     Other - See Comments Daughter         lives in [place] - Masters degree in education    Dementia Paternal Uncle         like his brother; her father    Other - See Comments Natural child         lives in [facility]    Neurologic Disorder Other      Past Surgical History:   Procedure Laterality Date    COLONOSCOPY      OTHER SURGICAL HISTORY      precancer skin lesion     Allergies   Allergen Reactions    Wasp Venom Protein Anaphylaxis    Bee Venom     Shellfish Allergy      VITALS:  Vitals:    24 0913   BP: 126/80   BP Location: Left arm   Patient Position: Sitting   Cuff Size: Adult Small   Pulse: 93   Resp: 11   Temp: 97.8  F (36.6  C)   TempSrc: Tympanic   SpO2: 99%   Weight: 61.7 kg (136 lb)   Height: 1.575 m (5' 2\")     Estimated body mass index is 24.87 kg/m  as calculated from the following:    Height as of this encounter: 1.575 m (5' 2\").    Weight as of this encounter: 61.7 kg (136 lb).  Wt Readings from Last 3 Encounters:   24 61.7 kg (136 lb)   10/13/23 62.5 kg (137 lb 12.8 oz)   23 60.3 kg (133 lb 0.6 oz)     PHYSICAL EXAM:  Constitutional:  In NAD, alert and oriented  Neck: no cervical or axillary adenopathy, there is bilateral prominent myofascial tenderness and spasm in paracervical and medial trapezius muscle groups.  Neck has good ROM, upper extremity and shoulder function are normal.   Cardiac:  S1 S2   Lungs: Clear     DECISION TO OBTAIN OLD RECORDS AND/OR OBTAIN HISTORY FROM SOMEONE OTHER THAN PATIENT, AND/OR ACCESSING CARE EVERYWHERE):  1  0 "     REVIEW AND SUMMARIZATION OF OLD RECORDS, AND/OR OBTAINING HISTORY FROM SOMEONE OTHER THAN PATIENT, AND/OR DISCUSSION OF CASE WITH ANOTHER HEALTH CARE PROVIDER:  2 reviewed past health notes    REVIEW AND/OR ORDER OF OF CLINICAL LAB TESTS: 1  0.    REVIEW AND/OR ORDER OF RADIOLOGY TESTS: 1 0.    REVIEW AND/OR ORDER OF MEDICAL TESTS (EKG/ECHO/COLONOSCOPY/EGD): 1  0    INDEPENDENT  VISUALIZATION OF IMAGE, TRACING, OR SPECIMEN ITSELF (2 EACH):  0     TOTAL: 2    Current Outpatient Medications   Medication Sig Dispense Refill    FISH OIL-LUTEIN-D-ZEAXANTHIN PO Different fish oil product by mouth daily      fish oil-omega-3 fatty acids 1000 MG capsule capsule      Lactobacillus (ACIDOPHILUS) TABS daily      magnesium gluconate 27.5 MG TABS Take 300 mg by mouth daily      Multiple Vitamin (MULTIVITAMIN ADULT PO) Take 1 tablet by mouth daily variable      predniSONE (DELTASONE) 20 MG tablet Take 2 tablets (40 mg) by mouth daily 20 tablet 0    SELENIUM PO 70mcg by mouth daily as needed      VITAMIN D PO Drops by mouth daily (winter); usually 4000 international units  = 100mcg       Martin Quezada MD  Internal Medicine  [facility]

## 2024-06-01 ENCOUNTER — HEALTH MAINTENANCE LETTER (OUTPATIENT)
Age: 59
End: 2024-06-01

## 2024-06-17 ENCOUNTER — OFFICE VISIT (OUTPATIENT)
Dept: FAMILY MEDICINE | Facility: CLINIC | Age: 59
End: 2024-06-17
Payer: COMMERCIAL

## 2024-06-17 VITALS
WEIGHT: 135 LBS | SYSTOLIC BLOOD PRESSURE: 105 MMHG | HEART RATE: 78 BPM | OXYGEN SATURATION: 97 % | BODY MASS INDEX: 24.84 KG/M2 | HEIGHT: 62 IN | DIASTOLIC BLOOD PRESSURE: 72 MMHG | TEMPERATURE: 97.5 F | RESPIRATION RATE: 15 BRPM

## 2024-06-17 DIAGNOSIS — Z13.820 ENCOUNTER FOR OSTEOPOROSIS SCREENING IN ASYMPTOMATIC POSTMENOPAUSAL PATIENT: ICD-10-CM

## 2024-06-17 DIAGNOSIS — R53.83 LOW ENERGY: ICD-10-CM

## 2024-06-17 DIAGNOSIS — R31.29 MICROSCOPIC HEMATURIA: ICD-10-CM

## 2024-06-17 DIAGNOSIS — Z78.0 ENCOUNTER FOR OSTEOPOROSIS SCREENING IN ASYMPTOMATIC POSTMENOPAUSAL PATIENT: ICD-10-CM

## 2024-06-17 DIAGNOSIS — Z12.31 ENCOUNTER FOR SCREENING MAMMOGRAM FOR BREAST CANCER: ICD-10-CM

## 2024-06-17 DIAGNOSIS — Z00.00 ROUTINE GENERAL MEDICAL EXAMINATION AT A HEALTH CARE FACILITY: ICD-10-CM

## 2024-06-17 DIAGNOSIS — T78.1XXA ADVERSE FOOD REACTION, INITIAL ENCOUNTER: ICD-10-CM

## 2024-06-17 LAB
ALBUMIN SERPL BCG-MCNC: 4.3 G/DL (ref 3.5–5.2)
ALBUMIN UR-MCNC: NEGATIVE MG/DL
ALP SERPL-CCNC: 62 U/L (ref 40–150)
ALT SERPL W P-5'-P-CCNC: 21 U/L (ref 0–50)
ANION GAP SERPL CALCULATED.3IONS-SCNC: 8 MMOL/L (ref 7–15)
APPEARANCE UR: CLEAR
AST SERPL W P-5'-P-CCNC: 31 U/L (ref 0–45)
BACTERIA #/AREA URNS HPF: ABNORMAL /HPF
BASOPHILS # BLD AUTO: 0 10E3/UL (ref 0–0.2)
BASOPHILS NFR BLD AUTO: 1 %
BILIRUB SERPL-MCNC: 0.3 MG/DL
BILIRUB UR QL STRIP: NEGATIVE
BUN SERPL-MCNC: 12.6 MG/DL (ref 8–23)
CALCIUM SERPL-MCNC: 9.5 MG/DL (ref 8.8–10.2)
CHLORIDE SERPL-SCNC: 104 MMOL/L (ref 98–107)
CHOLEST SERPL-MCNC: 288 MG/DL
COLOR UR AUTO: YELLOW
CREAT SERPL-MCNC: 0.58 MG/DL (ref 0.51–0.95)
CREAT UR-MCNC: 160 MG/DL
DEPRECATED HCO3 PLAS-SCNC: 28 MMOL/L (ref 22–29)
EGFRCR SERPLBLD CKD-EPI 2021: >90 ML/MIN/1.73M2
EOSINOPHIL # BLD AUTO: 0.1 10E3/UL (ref 0–0.7)
EOSINOPHIL NFR BLD AUTO: 3 %
ERYTHROCYTE [DISTWIDTH] IN BLOOD BY AUTOMATED COUNT: 12 % (ref 10–15)
FASTING STATUS PATIENT QL REPORTED: ABNORMAL
FASTING STATUS PATIENT QL REPORTED: ABNORMAL
FERRITIN SERPL-MCNC: 53 NG/ML (ref 11–328)
GLUCOSE SERPL-MCNC: 104 MG/DL (ref 70–99)
GLUCOSE UR STRIP-MCNC: NEGATIVE MG/DL
HCT VFR BLD AUTO: 39.1 % (ref 35–47)
HDLC SERPL-MCNC: 82 MG/DL
HGB BLD-MCNC: 13 G/DL (ref 11.7–15.7)
HGB UR QL STRIP: ABNORMAL
IMM GRANULOCYTES # BLD: 0 10E3/UL
IMM GRANULOCYTES NFR BLD: 0 %
IRON BINDING CAPACITY (ROCHE): 279 UG/DL (ref 240–430)
IRON SATN MFR SERPL: 37 % (ref 15–46)
IRON SERPL-MCNC: 102 UG/DL (ref 37–145)
KETONES UR STRIP-MCNC: NEGATIVE MG/DL
LDLC SERPL CALC-MCNC: 182 MG/DL
LEUKOCYTE ESTERASE UR QL STRIP: NEGATIVE
LYMPHOCYTES # BLD AUTO: 1.7 10E3/UL (ref 0.8–5.3)
LYMPHOCYTES NFR BLD AUTO: 35 %
MCH RBC QN AUTO: 30.4 PG (ref 26.5–33)
MCHC RBC AUTO-ENTMCNC: 33.2 G/DL (ref 31.5–36.5)
MCV RBC AUTO: 92 FL (ref 78–100)
MICROALBUMIN UR-MCNC: 27.9 MG/L
MICROALBUMIN/CREAT UR: 17.44 MG/G CR (ref 0–25)
MONOCYTES # BLD AUTO: 0.4 10E3/UL (ref 0–1.3)
MONOCYTES NFR BLD AUTO: 8 %
MUCOUS THREADS #/AREA URNS LPF: PRESENT /LPF
NEUTROPHILS # BLD AUTO: 2.7 10E3/UL (ref 1.6–8.3)
NEUTROPHILS NFR BLD AUTO: 54 %
NITRATE UR QL: NEGATIVE
NONHDLC SERPL-MCNC: 206 MG/DL
PH UR STRIP: 6 [PH] (ref 5–7)
PLATELET # BLD AUTO: 191 10E3/UL (ref 150–450)
POTASSIUM SERPL-SCNC: 4.9 MMOL/L (ref 3.4–5.3)
PROT SERPL-MCNC: 7 G/DL (ref 6.4–8.3)
RBC # BLD AUTO: 4.27 10E6/UL (ref 3.8–5.2)
RBC #/AREA URNS AUTO: ABNORMAL /HPF
SODIUM SERPL-SCNC: 140 MMOL/L (ref 135–145)
SP GR UR STRIP: 1.02 (ref 1–1.03)
TRIGL SERPL-MCNC: 118 MG/DL
TSH SERPL DL<=0.005 MIU/L-ACNC: 1.87 UIU/ML (ref 0.3–4.2)
UROBILINOGEN UR STRIP-ACNC: 0.2 E.U./DL
WBC # BLD AUTO: 5 10E3/UL (ref 4–11)
WBC #/AREA URNS AUTO: ABNORMAL /HPF

## 2024-06-17 PROCEDURE — 84443 ASSAY THYROID STIM HORMONE: CPT | Performed by: INTERNAL MEDICINE

## 2024-06-17 PROCEDURE — 90750 HZV VACC RECOMBINANT IM: CPT | Performed by: INTERNAL MEDICINE

## 2024-06-17 PROCEDURE — 80053 COMPREHEN METABOLIC PANEL: CPT | Performed by: INTERNAL MEDICINE

## 2024-06-17 PROCEDURE — 83540 ASSAY OF IRON: CPT | Performed by: INTERNAL MEDICINE

## 2024-06-17 PROCEDURE — 99396 PREV VISIT EST AGE 40-64: CPT | Mod: 25 | Performed by: INTERNAL MEDICINE

## 2024-06-17 PROCEDURE — 82728 ASSAY OF FERRITIN: CPT | Performed by: INTERNAL MEDICINE

## 2024-06-17 PROCEDURE — 82570 ASSAY OF URINE CREATININE: CPT | Performed by: INTERNAL MEDICINE

## 2024-06-17 PROCEDURE — 83550 IRON BINDING TEST: CPT | Performed by: INTERNAL MEDICINE

## 2024-06-17 PROCEDURE — 36415 COLL VENOUS BLD VENIPUNCTURE: CPT | Performed by: INTERNAL MEDICINE

## 2024-06-17 PROCEDURE — 85025 COMPLETE CBC W/AUTO DIFF WBC: CPT | Performed by: INTERNAL MEDICINE

## 2024-06-17 PROCEDURE — 99213 OFFICE O/P EST LOW 20 MIN: CPT | Mod: 25 | Performed by: INTERNAL MEDICINE

## 2024-06-17 PROCEDURE — 82043 UR ALBUMIN QUANTITATIVE: CPT | Performed by: INTERNAL MEDICINE

## 2024-06-17 PROCEDURE — 90471 IMMUNIZATION ADMIN: CPT | Performed by: INTERNAL MEDICINE

## 2024-06-17 PROCEDURE — 80061 LIPID PANEL: CPT | Performed by: INTERNAL MEDICINE

## 2024-06-17 PROCEDURE — 81001 URINALYSIS AUTO W/SCOPE: CPT | Performed by: INTERNAL MEDICINE

## 2024-06-17 RX ORDER — PREDNISONE 20 MG/1
40 TABLET ORAL DAILY
COMMUNITY
Start: 2024-06-17

## 2024-06-17 RX ORDER — CHOLECALCIFEROL (VITAMIN D3) 10 MCG
300 TABLET ORAL DAILY
Status: CANCELLED | OUTPATIENT
Start: 2024-06-17

## 2024-06-17 RX ORDER — CHLORHEXIDINE/GLYCERIN/HE-CELL
JELLY (GRAM) TOPICAL
COMMUNITY
End: 2024-06-17

## 2024-06-17 RX ORDER — VITAMIN B COMPLEX
1 CAPSULE ORAL DAILY
COMMUNITY
End: 2024-06-17

## 2024-06-17 SDOH — HEALTH STABILITY: PHYSICAL HEALTH: ON AVERAGE, HOW MANY DAYS PER WEEK DO YOU ENGAGE IN MODERATE TO STRENUOUS EXERCISE (LIKE A BRISK WALK)?: 5 DAYS

## 2024-06-17 ASSESSMENT — SOCIAL DETERMINANTS OF HEALTH (SDOH): HOW OFTEN DO YOU GET TOGETHER WITH FRIENDS OR RELATIVES?: ONCE A WEEK

## 2024-06-17 ASSESSMENT — PAIN SCALES - GENERAL: PAINLEVEL: NO PAIN (0)

## 2024-06-17 NOTE — PATIENT INSTRUCTIONS
"- Check in with Nate Simental neuro genetics counselor to evaluate genetics related to tremor and dementia    - I recommend getting 1200 mg of calcium and 2000 units of vitamin D every day (through diet and supplements total)    Patient Education   Preventive Care Advice   This is general advice we often give to help people stay healthy. Your care team may have specific advice just for you. Please talk to your care team about your own preventive care needs.  Lifestyle  Exercise at least 150 minutes each week (30 minutes a day, 5 days a week).  Do muscle strengthening activities 2 days a week. These help control your weight and prevent disease.  No smoking.  Wear sunscreen to prevent skin cancer.  Have your home tested for radon every 2 to 5 years. Radon is a colorless, odorless gas that can harm your lungs. To learn more, go to www.health.Count includes the [facility].mn.us and search for \"Radon in Homes.\"  Keep guns unloaded and locked up in a safe place like a safe or gun vault, or, use a gun lock and hide the keys. Always lock away bullets separately. To learn more, visit [place].mn.gov and search for \"safe gun storage.\"  Nutrition  Eat 5 or more servings of fruits and vegetables each day.  Try wheat bread, brown rice and whole grain pasta (instead of white bread, rice, and pasta).  Get enough calcium and vitamin D. Check the label on foods and aim for 100% of the RDA (recommended daily allowance).  Regular exams  Have a dental exam and cleaning every 6 months.  See your health care team every year to talk about:  Any changes in your health.  Any medicines your care team has prescribed.  Preventive care, family planning, and ways to prevent chronic diseases.  Shots (vaccines)   HPV shots (up to age 26), if you've never had them before.  Hepatitis B shots (up to age 59), if you've never had them before.  COVID-19 shot: Get this shot when it's due.  Flu shot: Get a flu shot every year.  Tetanus shot: Get a tetanus shot every 10 " years.  Pneumococcal, hepatitis A, and RSV shots: Ask your care team if you need these based on your risk.  Shingles shot (for age 50 and up).  General health tests  Diabetes screening:  Starting at age 35, Get screened for diabetes at least every 3 years.  If you are younger than age 35, ask your care team if you should be screened for diabetes.  Cholesterol test: At age 39, start having a cholesterol test every 5 years, or more often if advised.  Bone density scan (DEXA): At age 50, ask your care team if you should have this scan for osteoporosis (brittle bones).  Hepatitis C: Get tested at least once in your life.  Abdominal aortic aneurysm screening: Talk to your doctor about having this screening if you:  Have ever smoked; and  Are biologically male; and  Are between the ages of 65 and 75.  STIs (sexually transmitted infections)  Before age 24: Ask your care team if you should be screened for STIs.  After age 24: Get screened for STIs if you're at risk. You are at risk for STIs (including HIV) if:  You are sexually active with more than one person.  You don't use condoms every time.  You or a partner was diagnosed with a sexually transmitted infection.  If you are at risk for HIV, ask about PrEP medicine to prevent HIV.  Get tested for HIV at least once in your life, whether you are at risk for HIV or not.  Cancer screening tests  Cervical cancer screening: If you have a cervix, begin getting regular cervical cancer screening tests at age 21. Most people who have regular screenings with normal results can stop after age 65. Talk about this with your provider.  Breast cancer scan (mammogram): If you've ever had breasts, begin having regular mammograms starting at age 40. This is a scan to check for breast cancer.  Colon cancer screening: It is important to start screening for colon cancer at age 45.  Have a colonoscopy test every 10 years (or more often if you're at risk) Or, ask your provider about stool tests  like a FIT test every year or Cologuard test every 3 years.  To learn more about your testing options, visit: www.[facility]/284739.pdf.  For help making a decision, visit: claudette/iv56159.  Prostate cancer screening test: If you have a prostate and are age 55 to 69, ask your provider if you would benefit from a yearly prostate cancer screening test.  Lung cancer screening: If you are a current or former smoker age 50 to 80, ask your care team if ongoing lung cancer screenings are right for you.  For informational purposes only. Not to replace the advice of your health care provider. Copyright   2023 [facility]. All rights reserved. Clinically reviewed by the [facility] Transitions Program. [employer] [id number] - REV 04/24.

## 2024-06-17 NOTE — PROGRESS NOTES
Preventive Care Visit  [facility]  Gianna Grady DO, Internal Medicine  Jun 17, 2024      Assessment & Plan     (Z00.00) Routine general medical examination at a health care facility  Comment:   Plan: Lipid panel reflex to direct LDL Non-fasting  Mammo: 1/9/23- extremely dense  Pap: Due 2026  Colon (45-75): Due 2026  DEXA: Mom with hip fracture- recommend starting screening  Immunizations: Shingles, will get COVID in the fall  Labs: Lipids, diabetes screen (glucose)  Discussed healthy lifestyle and aging recommendations including regular exercise, adequate and regular sleep, 5+ fruits and veggies daily.      (T78.1XXA) Adverse food reaction, initial encounter  Comment: Large local reactions to wasp stings- saw Allergy 10/2023 and was prescribed prednisone to help with local reaction.  No EpiPen needed.  Plan: predniSONE (DELTASONE) 20 MG tablet    (R53.83) Low energy  Comment: Noticing more low energy- check TSH and iron studies.  Plan: TSH with free T4 reflex, CBC with platelets and        differential, Ferritin, Iron and iron binding         capacity    (Z13.820,  Z78.0) Encounter for osteoporosis screening in asymptomatic postmenopausal patient  Comment: Mom with hip fx and osteopenia- check DEXA scan.  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  Plan: UA Macroscopic with reflex to Microscopic and         Culture - Lab Collect, Adult Nephrology          Referral, Comprehensive metabolic         panel (BMP + Alb, Alk Phos, ALT, AST, Total.         Bili, TP), Albumin Random Urine Quantitative         with Creat Ratio    (Z12.31) Encounter for screening mammogram for breast cancer  Comment:   Plan: MA Screen Bilateral w/Jose      Patient has been advised of split billing requirements and  "indicates understanding: Yes      Counseling  Appropriate preventive services were discussed with this patient, including applicable screening as appropriate for fall prevention, nutrition, physical activity, Tobacco-use cessation, weight loss and cognition.  Checklist reviewing preventive services available has been given to the patient.  Reviewed patient's diet, addressing concerns and/or questions.   She is at risk for psychosocial distress and has been provided with information to reduce risk.       Patient Instructions   - Check in with Nate Simental neuro genetics counselor to evaluate genetics related to tremor and dementia    - I recommend getting 1200 mg of calcium and 2000 units of vitamin D every day (through diet and supplements total)    Patient Education  Preventive Care Advice   This is general advice we often give to help people stay healthy. Your care team may have specific advice just for you. Please talk to your care team about your own preventive care needs.  Lifestyle  Exercise at least 150 minutes each week (30 minutes a day, 5 days a week).  Do muscle strengthening activities 2 days a week. These help control your weight and prevent disease.  No smoking.  Wear sunscreen to prevent skin cancer.  Have your home tested for radon every 2 to 5 years. Radon is a colorless, odorless gas that can harm your lungs. To learn more, go to www.health.[facility].mn.us and search for \"Radon in Homes.\"  Keep guns unloaded and locked up in a safe place like a safe or gun vault, or, use a gun lock and hide the keys. Always lock away bullets separately. To learn more, visit [employer].mn.gov and search for \"safe gun storage.\"  Nutrition  Eat 5 or more servings of fruits and vegetables each day.  Try wheat bread, brown rice and whole grain pasta (instead of white bread, rice, and pasta).  Get enough calcium and vitamin D. Check the label on foods and aim for 100% of the RDA (recommended daily allowance).  Regular exams  Have a " dental exam and cleaning every 6 months.  See your health care team every year to talk about:  Any changes in your health.  Any medicines your care team has prescribed.  Preventive care, family planning, and ways to prevent chronic diseases.  Shots (vaccines)   HPV shots (up to age 26), if you've never had them before.  Hepatitis B shots (up to age 59), if you've never had them before.  COVID-19 shot: Get this shot when it's due.  Flu shot: Get a flu shot every year.  Tetanus shot: Get a tetanus shot every 10 years.  Pneumococcal, hepatitis A, and RSV shots: Ask your care team if you need these based on your risk.  Shingles shot (for age 50 and up).  General health tests  Diabetes screening:  Starting at age 35, Get screened for diabetes at least every 3 years.  If you are younger than age 35, ask your care team if you should be screened for diabetes.  Cholesterol test: At age 39, start having a cholesterol test every 5 years, or more often if advised.  Bone density scan (DEXA): At age 50, ask your care team if you should have this scan for osteoporosis (brittle bones).  Hepatitis C: Get tested at least once in your life.  Abdominal aortic aneurysm screening: Talk to your doctor about having this screening if you:  Have ever smoked; and  Are biologically male; and  Are between the ages of 65 and 75.  STIs (sexually transmitted infections)  Before age 24: Ask your care team if you should be screened for STIs.  After age 24: Get screened for STIs if you're at risk. You are at risk for STIs (including HIV) if:  You are sexually active with more than one person.  You don't use condoms every time.  You or a partner was diagnosed with a sexually transmitted infection.  If you are at risk for HIV, ask about PrEP medicine to prevent HIV.  Get tested for HIV at least once in your life, whether you are at risk for HIV or not.  Cancer screening tests  Cervical cancer screening: If you have a cervix, begin getting regular  cervical cancer screening tests at age 21. Most people who have regular screenings with normal results can stop after age 65. Talk about this with your provider.  Breast cancer scan (mammogram): If you've ever had breasts, begin having regular mammograms starting at age 40. This is a scan to check for breast cancer.  Colon cancer screening: It is important to start screening for colon cancer at age 45.  Have a colonoscopy test every 10 years (or more often if you're at risk) Or, ask your provider about stool tests like a FIT test every year or Cologuard test every 3 years.  To learn more about your testing options, visit: www.[employer]/766304.pdf.  For help making a decision, visit: claudette/se12537.  Prostate cancer screening test: If you have a prostate and are age 55 to 69, ask your provider if you would benefit from a yearly prostate cancer screening test.  Lung cancer screening: If you are a current or former smoker age 50 to 80, ask your care team if ongoing lung cancer screenings are right for you.  For informational purposes only. Not to replace the advice of your health care provider. Copyright   2023 [employer]. All rights reserved. Clinically reviewed by the [facility]. [employer] [id number] - REV 04/24.       Aria Pedroza is a 59 year old, presenting for the following:  Physical (Physical )        6/17/2024     8:53 AM   Additional Questions   Roomed by Mouna Luna        [facility] Care Directive  Patient does not have a Health Care Directive or Living Will: Discussed advance care planning with patient; however, patient declined at this time.    HPI    Has a few out standing things.    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  Last saw Dr. Cabrera in June 2020.  No concerns for hearing loss.    Has gained some weight- wondering if related to stress of 's long COVID diagnosis in the last year which has been exhausting  and has made it difficult to exercise.         6/17/2024   General Health   How would you rate your overall physical health? Good   Feel stress (tense, anxious, or unable to sleep) Only a little   (!) STRESS CONCERN      6/17/2024   Nutrition   Three or more servings of calcium each day? Yes   Diet: Gluten-free/reduced   How many servings of fruit and vegetables per day? 4 or more   How many sweetened beverages each day? 0-1         6/17/2024   Exercise   Days per week of moderate/strenous exercise 5 days         6/17/2024   Social Factors   Frequency of gathering with friends or relatives Once a week   Worry food won't last until get money to buy more No   Food not last or not have enough money for food? No   Do you have housing?  Yes   Are you worried about losing your housing? No   Lack of transportation? No   Unable to get utilities (heat,electricity)? No         6/17/2024   Fall Risk   Fallen 2 or more times in the past year? No   Trouble with walking or balance? No          6/17/2024   Dental   Dentist two times every year? Yes         6/17/2024   TB Screening   Were you born outside of the [place]? No           Today's PHQ-2 Score:       1/8/2024    11:42 AM   PHQ-2 ( 1999 Pfizer)   Q1: Little interest or pleasure in doing things 0   Q2: Feeling down, depressed or hopeless 0   PHQ-2 Score 0         6/17/2024   Substance Use   Alcohol more than 3/day or more than 7/wk No   Do you use any other substances recreationally? No     Social History     Tobacco Use    Smoking status: Never    Smokeless tobacco: Never   Vaping Use    Vaping status: Never Used   Substance Use Topics    Alcohol use: Yes     Comment: once/month if that    Drug use: No             6/17/2024   Breast Cancer Screening   Family history of breast, colon, or ovarian cancer? No / Unknown         1/9/2023   LAST FHS-7 RESULTS   1st degree relative breast or ovarian cancer No   Any relative bilateral breast cancer No   Any male have breast cancer No    Any ONE woman have BOTH breast AND ovarian cancer No   Any woman with breast cancer before 50yrs No   2 or more relatives with breast AND/OR ovarian cancer No   2 or more relatives with breast AND/OR bowel cancer No        Mammogram Screening - Mammogram every 1-2 years updated in Health Maintenance based on mutual decision making        2024   STI Screening   New sexual partner(s) since last STI/HIV test? No     History of abnormal Pap smear: No - age 30- 64 PAP with HPV every 5 years recommended        Latest Ref Rng & Units 2021     9:36 AM 2020     8:20 AM 2013    12:00 AM   PAP / HPV   PAP  Negative for Intraepithelial Lesion or Malignancy (NILM)  Negative for squamous intraepithelial lesion or malignancy  Electronically signed by Corazon Frederick CT (ASCP) on 2020 at 11:16 AM       PAP (Historical)    ASC-US    HPV 16 DNA Negative Negative  Negative     HPV 18 DNA Negative Negative  Negative     Other HR HPV Negative Negative  Negative       ASCVD Risk   The 10-year ASCVD risk score (Brandt NEFF, et al., 2019) is: 3.4%    Values used to calculate the score:      Age: 59 years      Sex: Female      Is Non- : No      Diabetic: No      Tobacco smoker: No      Systolic Blood Pressure: 126 mmHg      Is BP treated: No      HDL Cholesterol: 87 mg/dL      Total Cholesterol: 317 mg/dL    Fracture Risk Assessment Tool  Link to Frax Calculator  Use the information below to complete the Frax calculator  : 1965  Sex: female  Weight (kg): 61.2 kg (actual weight)  Height (cm): 157.5 cm  Previous Fragility Fracture:  No  History of parent with fractured hip:  Yes  Current Smoking:  No  Patient has been on glucocorticoids for more than 3 months (5mg/day or more): No  Rheumatoid Arthritis on Problem List:  No  Secondary Osteoporosis on Problem List:  No  Consumes 3 or more units of alcohol per day: No  Femoral Neck BMD (g/cm2)           Reviewed and updated as  "needed this visit by Provider                    Past Medical History:   Diagnosis Date    Cervicalgia     Hyperlipidemia LDL goal <130     Thyroid disease 2015?    Tremor 01/26/2022     Past Surgical History:   Procedure Laterality Date    COLONOSCOPY      OTHER SURGICAL HISTORY      precancer skin lesion     Lab work is in process         Objective    Exam  LMP 08/21/2013 (Approximate)    Estimated body mass index is 24.87 kg/m  as calculated from the following:    Height as of 3/26/24: 1.575 m (5' 2\").    Weight as of 3/26/24: 61.7 kg (136 lb).    Physical Exam  GENERAL: alert and no distress  EYES: Eyes grossly normal to inspection, PERRL and conjunctivae and sclerae normal  HENT: ear canals and TM's normal, nose and mouth without ulcers or lesions  NECK: no adenopathy, no asymmetry, masses, or scars  RESP: lungs clear to auscultation - no rales, rhonchi or wheezes  BREAST: normal without masses, tenderness or nipple discharge and no palpable axillary masses or adenopathy  CV: regular rate and rhythm, normal S1 S2, no S3 or S4, no murmur, click or rub, no peripheral edema  ABDOMEN: soft, nontender, no hepatosplenomegaly, no masses and bowel sounds normal  MS: no gross musculoskeletal defects noted, no edema  SKIN: no suspicious lesions or rashes  NEURO: Normal strength and tone, mentation intact and speech normal  PSYCH: mentation appears normal, affect normal/bright        Signed Electronically by: Gianna Grady, DO    "

## 2024-08-01 ENCOUNTER — ANCILLARY PROCEDURE (OUTPATIENT)
Dept: MAMMOGRAPHY | Facility: CLINIC | Age: 59
End: 2024-08-01
Attending: INTERNAL MEDICINE
Payer: COMMERCIAL

## 2024-08-01 DIAGNOSIS — Z12.31 ENCOUNTER FOR SCREENING MAMMOGRAM FOR BREAST CANCER: ICD-10-CM

## 2024-08-01 PROCEDURE — 77063 BREAST TOMOSYNTHESIS BI: CPT

## 2024-08-01 PROCEDURE — 77067 SCR MAMMO BI INCL CAD: CPT

## 2024-08-15 ENCOUNTER — MYC MEDICAL ADVICE (OUTPATIENT)
Dept: FAMILY MEDICINE | Facility: CLINIC | Age: 59
End: 2024-08-15
Payer: COMMERCIAL

## 2025-01-23 ENCOUNTER — TRANSFERRED RECORDS (OUTPATIENT)
Dept: HEALTH INFORMATION MANAGEMENT | Facility: CLINIC | Age: 60
End: 2025-01-23
Payer: COMMERCIAL

## 2025-02-13 ENCOUNTER — MYC MEDICAL ADVICE (OUTPATIENT)
Dept: FAMILY MEDICINE | Facility: CLINIC | Age: 60
End: 2025-02-13
Payer: COMMERCIAL

## 2025-02-17 NOTE — TELEPHONE ENCOUNTER
Writer replied to patient via [employer]. E-visit advised.    MIGDALIA SalcedoN, PHN, AMB-BC (she/her)  [facility] RN
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
## 2025-07-23 ENCOUNTER — OFFICE VISIT (OUTPATIENT)
Dept: FAMILY MEDICINE | Facility: CLINIC | Age: 60
End: 2025-07-23
Payer: COMMERCIAL

## 2025-07-23 VITALS
BODY MASS INDEX: 25.27 KG/M2 | OXYGEN SATURATION: 97 % | DIASTOLIC BLOOD PRESSURE: 60 MMHG | TEMPERATURE: 97.3 F | RESPIRATION RATE: 20 BRPM | WEIGHT: 137.3 LBS | HEIGHT: 62 IN | SYSTOLIC BLOOD PRESSURE: 110 MMHG | HEART RATE: 68 BPM

## 2025-07-23 DIAGNOSIS — N18.9 CHRONIC KIDNEY DISEASE, UNSPECIFIED: Primary | ICD-10-CM

## 2025-07-23 DIAGNOSIS — R23.3 EASY BRUISING: ICD-10-CM

## 2025-07-23 DIAGNOSIS — R23.3 BRUISING, SPONTANEOUS: Primary | ICD-10-CM

## 2025-07-23 LAB
APTT PPP: 27 SECONDS (ref 22–38)
ERYTHROCYTE [DISTWIDTH] IN BLOOD BY AUTOMATED COUNT: 12.1 % (ref 10–15)
HCT VFR BLD AUTO: 39.1 % (ref 35–47)
HGB BLD-MCNC: 12.9 G/DL (ref 11.7–15.7)
INR PPP: 0.98 (ref 0.85–1.15)
MCH RBC QN AUTO: 30.2 PG (ref 26.5–33)
MCHC RBC AUTO-ENTMCNC: 33 G/DL (ref 31.5–36.5)
MCV RBC AUTO: 92 FL (ref 78–100)
PLATELET # BLD AUTO: 192 10E3/UL (ref 150–450)
PROTHROMBIN TIME: 13.3 SECONDS (ref 11.8–14.8)
RBC # BLD AUTO: 4.27 10E6/UL (ref 3.8–5.2)
WBC # BLD AUTO: 6.3 10E3/UL (ref 4–11)

## 2025-07-23 PROCEDURE — 85730 THROMBOPLASTIN TIME PARTIAL: CPT | Performed by: FAMILY MEDICINE

## 2025-07-23 PROCEDURE — 3074F SYST BP LT 130 MM HG: CPT | Performed by: FAMILY MEDICINE

## 2025-07-23 PROCEDURE — 85027 COMPLETE CBC AUTOMATED: CPT | Performed by: FAMILY MEDICINE

## 2025-07-23 PROCEDURE — 85610 PROTHROMBIN TIME: CPT | Performed by: FAMILY MEDICINE

## 2025-07-23 PROCEDURE — 1126F AMNT PAIN NOTED NONE PRSNT: CPT | Performed by: FAMILY MEDICINE

## 2025-07-23 PROCEDURE — 99213 OFFICE O/P EST LOW 20 MIN: CPT | Performed by: FAMILY MEDICINE

## 2025-07-23 PROCEDURE — 36415 COLL VENOUS BLD VENIPUNCTURE: CPT | Performed by: FAMILY MEDICINE

## 2025-07-23 PROCEDURE — 3078F DIAST BP <80 MM HG: CPT | Performed by: FAMILY MEDICINE

## 2025-07-23 ASSESSMENT — PAIN SCALES - GENERAL: PAINLEVEL_OUTOF10: NO PAIN (0)

## 2025-07-23 NOTE — PROGRESS NOTES
"  {PROVIDER CHARTING PREFERENCE:896579}    Aria Pedroza is a 60 year old, presenting for the following health issues:  Bleeding/Bruising (Other ongoing issues)      7/23/2025    10:02 AM   Additional Questions   Roomed by Sherri Hammonds   Accompanied by alone         7/23/2025    10:02 AM   Patient Reported Additional Medications   Patient reports taking the following new medications none     History of Present Illness       Reason for visit:  Bruising  Symptom onset:  More than a month  Symptoms include:  Bruising  Symptom intensity:  Mild  Symptom progression:  Worsening  Had these symptoms before:  Yes  Has tried/received treatment for these symptoms:  No  What makes it worse:  Na  What makes it better:  Na   She is taking medications regularly.        20 year history.    Typically shows up in fingers. Finger bruise in Feb.  Recently had one on leg and chin.  Bruise on leg hurt initially.   Does not take aspirin.    Derm spot on chest for last few week.  No family hx of bleeding disorder.   {MA/LPN/RN Pre-Provider Visit Orders- hCG/UA/Strep (Optional):848027}  {SUPERLIST (Optional):900456}  {additonal problems for provider to add (Optional):799272}    {ROS Picklists (Optional):254695}      Objective    /60 (BP Location: Right arm, Patient Position: Sitting, Cuff Size: Adult Regular)   Pulse 68   Temp 97.3  F (36.3  C) (Temporal)   Resp 20   Ht 1.575 m (5' 2\")   Wt 62.3 kg (137 lb 4.8 oz)   LMP 08/21/2013 (Approximate)   SpO2 97%   BMI 25.11 kg/m    Body mass index is 25.11 kg/m .  Physical Exam   {Exam List (Optional):893297}    {Diagnostic Test Results (Optional):456355}        Signed Electronically by: Fabrizio Shen DO  {Email feedback regarding this note to [email or web address]   :621013}  " "Location: Right arm, Patient Position: Sitting, Cuff Size: Adult Regular)   Pulse 68   Temp 97.3  F (36.3  C) (Temporal)   Resp 20   Ht 1.575 m (5' 2\")   Wt 62.3 kg (137 lb 4.8 oz)   LMP 08/21/2013 (Approximate)   SpO2 97%   BMI 25.11 kg/m    Body mass index is 25.11 kg/m .  Physical Exam   GENERAL: alert and no distress  SKIN: tip of chin with small old ecchymosis about 2 cm x 2 cm.  PSYCH: mentation appears normal, affect normal/bright            Signed Electronically by: Fabrizio Shen DO    "

## 2025-07-24 ENCOUNTER — TRANSFERRED RECORDS (OUTPATIENT)
Dept: HEALTH INFORMATION MANAGEMENT | Facility: CLINIC | Age: 60
End: 2025-07-24
Payer: COMMERCIAL

## 2025-07-30 ENCOUNTER — MYC MEDICAL ADVICE (OUTPATIENT)
Dept: FAMILY MEDICINE | Facility: CLINIC | Age: 60
End: 2025-07-30
Payer: COMMERCIAL

## 2025-07-30 NOTE — TELEPHONE ENCOUNTER
Writer responded via [employer].  Dagmar RAMOS, BSN, PHN, RN-[facility]  [phone]

## 2025-08-07 ENCOUNTER — ANCILLARY PROCEDURE (OUTPATIENT)
Dept: MAMMOGRAPHY | Facility: CLINIC | Age: 60
End: 2025-08-07
Attending: INTERNAL MEDICINE
Payer: COMMERCIAL

## 2025-08-07 DIAGNOSIS — Z12.31 VISIT FOR SCREENING MAMMOGRAM: ICD-10-CM

## 2025-08-07 PROCEDURE — 77067 SCR MAMMO BI INCL CAD: CPT | Performed by: RADIOLOGY

## 2025-08-07 PROCEDURE — 77063 BREAST TOMOSYNTHESIS BI: CPT | Performed by: RADIOLOGY
